# Patient Record
Sex: MALE | Race: BLACK OR AFRICAN AMERICAN | NOT HISPANIC OR LATINO | Employment: FULL TIME | URBAN - METROPOLITAN AREA
[De-identification: names, ages, dates, MRNs, and addresses within clinical notes are randomized per-mention and may not be internally consistent; named-entity substitution may affect disease eponyms.]

---

## 2017-04-26 ENCOUNTER — TELEPHONE (OUTPATIENT)
Dept: INTERNAL MEDICINE | Age: 42
End: 2017-04-26

## 2019-11-19 ENCOUNTER — HOSPITAL ENCOUNTER (OUTPATIENT)
Age: 44
Discharge: HOME | End: 2019-11-19
Payer: COMMERCIAL

## 2019-11-19 ENCOUNTER — APPOINTMENT (OUTPATIENT)
Dept: GENERAL RADIOLOGY | Age: 44
End: 2019-11-19
Attending: PHYSICIAN ASSISTANT
Payer: COMMERCIAL

## 2019-11-19 ENCOUNTER — APPOINTMENT (OUTPATIENT)
Dept: CT IMAGING | Age: 44
End: 2019-11-19
Attending: PHYSICIAN ASSISTANT
Payer: COMMERCIAL

## 2019-11-19 VITALS
RESPIRATION RATE: 16 BRPM | DIASTOLIC BLOOD PRESSURE: 75 MMHG | TEMPERATURE: 99 F | OXYGEN SATURATION: 99 % | SYSTOLIC BLOOD PRESSURE: 132 MMHG | HEART RATE: 68 BPM

## 2019-11-19 DIAGNOSIS — S49.91XA INJURY OF RIGHT SHOULDER, INITIAL ENCOUNTER: ICD-10-CM

## 2019-11-19 DIAGNOSIS — M54.6 ACUTE MIDLINE THORACIC BACK PAIN: ICD-10-CM

## 2019-11-19 DIAGNOSIS — S09.90XA INJURY OF HEAD, INITIAL ENCOUNTER: Primary | ICD-10-CM

## 2019-11-19 PROCEDURE — 96372 THER/PROPH/DIAG INJ SC/IM: CPT

## 2019-11-19 PROCEDURE — 99204 OFFICE O/P NEW MOD 45 MIN: CPT

## 2019-11-19 PROCEDURE — 73030 X-RAY EXAM OF SHOULDER: CPT | Performed by: PHYSICIAN ASSISTANT

## 2019-11-19 PROCEDURE — 70450 CT HEAD/BRAIN W/O DYE: CPT | Performed by: PHYSICIAN ASSISTANT

## 2019-11-19 PROCEDURE — 71101 X-RAY EXAM UNILAT RIBS/CHEST: CPT | Performed by: PHYSICIAN ASSISTANT

## 2019-11-19 PROCEDURE — 72072 X-RAY EXAM THORAC SPINE 3VWS: CPT | Performed by: PHYSICIAN ASSISTANT

## 2019-11-19 RX ORDER — CYCLOBENZAPRINE HCL 10 MG
10 TABLET ORAL 3 TIMES DAILY PRN
Qty: 30 TABLET | Refills: 0 | Status: SHIPPED | OUTPATIENT
Start: 2019-11-19

## 2019-11-19 RX ORDER — AZITHROMYCIN 250 MG/1
TABLET, FILM COATED ORAL
Qty: 1 PACKAGE | Refills: 0 | Status: SHIPPED | OUTPATIENT
Start: 2019-11-19 | End: 2019-11-19 | Stop reason: CLARIF

## 2019-11-19 RX ORDER — METHYLPREDNISOLONE 4 MG/1
TABLET ORAL
Qty: 1 PACKAGE | Refills: 0 | Status: SHIPPED | OUTPATIENT
Start: 2019-11-19 | End: 2019-11-19 | Stop reason: CLARIF

## 2019-11-19 RX ORDER — ALBUTEROL SULFATE 90 UG/1
2 AEROSOL, METERED RESPIRATORY (INHALATION) EVERY 4 HOURS PRN
Qty: 1 INHALER | Refills: 0 | Status: SHIPPED | OUTPATIENT
Start: 2019-11-19 | End: 2019-11-19 | Stop reason: CLARIF

## 2019-11-19 RX ORDER — KETOROLAC TROMETHAMINE 30 MG/ML
30 INJECTION, SOLUTION INTRAMUSCULAR; INTRAVENOUS ONCE
Status: COMPLETED | OUTPATIENT
Start: 2019-11-19 | End: 2019-11-19

## 2019-11-19 RX ORDER — IBUPROFEN 800 MG/1
800 TABLET ORAL EVERY 8 HOURS PRN
Qty: 20 TABLET | Refills: 0 | Status: SHIPPED | OUTPATIENT
Start: 2019-11-19

## 2019-11-20 NOTE — ED PROVIDER NOTES
Patient Seen in: [facility]      History   Patient presents with:  Fall (musculoskeletal, neurologic)  Head Neck Injury (neurologic, musculoskeletal)    Stated Complaint: s/p fall, r shoulder pain, l lower back pain, hurts to ta and vital signs reviewed. All other systems reviewed and negative except as noted above.     Physical Exam     ED Triage Vitals [11/19/19 1841]   BP (!) 158/91   Pulse 74   Resp 16   Temp 98.5 °F (36.9 °C)   Temp src Oral   SpO2 97 %   O2 Device None ( the thoracic spine. No midline tenderness of the lumbar spine. Tender to palpation with reproduction of pain over the posterior left ribs.       ED Course     Xr Thoracic Spine (3 Views) (cpt=72072)    Result Date: 11/19/2019  PROCEDURE:  XR ROUTINE THORA deep breath  TECHNIQUE:  Noncontrast CT scanning is performed through the brain. Dose reduction techniques were used.  Dose information is transmitted to the [facility] ([address] [place] [address] of Radiology) Ramez Graves [address] ([address]) which includes the Dose silhouette. MEDIASTINUM:  Normal. PLEURA:  Normal. BONES:  Postsurgical changes involve the distal right clavicle and superior left glenoid/scapula. No definite acute fracture. SOFT TISSUES:  Negative. CONCLUSION:  No acute fracture.   Postsurgical ch

## 2019-11-20 NOTE — ED INITIAL ASSESSMENT (HPI)
Pt was standing in back of truck and fell backwards onto driveway - pt hit his back, back of head, and R shoulder ; when taking a deep breath or raising arms - pain to L rib and back    No loc/vom/hematuria    Work injury - [employer]

## 2020-08-05 ENCOUNTER — OFFICE VISIT (OUTPATIENT)
Dept: OCCUPATIONAL MEDICINE | Age: 45
End: 2020-08-05
Attending: FAMILY MEDICINE

## 2023-02-09 NOTE — LETTER
Date & Time: 11/19/2019, 8:28 PM  Patient: China Davis  Encounter Provider(s):    Jorge A Mccarthy PA-C       To Whom It May Concern:    China Davis was seen and treated in our department on 11/19/2019.  Please excuse him from work the next 2 d Prescription refused.  The patient does not need anymore at this high dose.  The patient will continue with supplements over-the-counter 2000 units daily.

## 2024-02-23 PROBLEM — Z12.11 SPECIAL SCREENING FOR MALIGNANT NEOPLASM OF COLON: Status: ACTIVE | Noted: 2024-02-23

## 2024-06-28 ENCOUNTER — OFFICE VISIT (OUTPATIENT)
Dept: ORTHOPEDICS CLINIC | Facility: CLINIC | Age: 49
End: 2024-06-28

## 2024-06-28 VITALS — HEIGHT: 63 IN | WEIGHT: 175 LBS | BODY MASS INDEX: 31.01 KG/M2

## 2024-06-28 DIAGNOSIS — M23.92 KNEE LOCKING, LEFT: Primary | ICD-10-CM

## 2024-06-28 PROCEDURE — 99204 OFFICE O/P NEW MOD 45 MIN: CPT | Performed by: PHYSICIAN ASSISTANT

## 2024-06-28 NOTE — H&P
[facility] - ORTHOPEDICS  [address] [place] [address]  [phone]     NEW PATIENT VISIT - HISTORY AND PHYSICAL EXAMINATION     Name: Jose Alfredo Gunderson   MRN: [id number]  Date: 6/28/2024     CC: Left knee pain.     REFERRED BY: CHRISTIANO HILL    HPI:   Jose Alfredo Gunderson is a very pleasant 48 year old male who presents today for evaluation, consultation, and management of left knee injury that occurred after working on a lift gate off of a truck at work on June 21, 2024.  He reported the injury and has had stiffness, locking and weakness.  Pain is an 8 out of 10.  He works as a  for [employer] and has been in this position for 2 years. No previously left knee.       PMH:   Past Medical History:    Decorative tattoo    Headache disorder    Pain in joints       PAST SURGICAL HX:  Past Surgical History:   Procedure Laterality Date    Other      R patellar surgery    Other      L shoulder \"restructured\"    Sinus surgery        Vasectomy         FAMILY HX:  Family History   Problem Relation Age of Onset    Heart Attack Father     Diabetes Mother        ALLERGIES:  Patient has no known allergies.    MEDICATIONS:   Current Outpatient Medications   Medication Sig Dispense Refill    PEG 3350-KCl-Na Bicarb-NaCl 420 g Oral Recon Soln Take as directed by physician 4000 mL 0    mupirocin (BACTROBAN) 2 % External Ointment Apply to affected area 3 times a day 1 Tube 0    Cyclobenzaprine HCl 10 MG Oral Tab Take 1 tablet (10 mg total) by mouth 3 (three) times daily as needed for Muscle spasms. 30 tablet 0    ibuprofen 800 MG Oral Tab Take 1 tablet (800 mg total) by mouth every 8 (eight) hours as needed for Pain. 20 tablet 0       ROS: A comprehensive 14 point review of systems was performed and was negative aside from the aforementioned per history of present illness.    SOCIAL HX:  Social History     Occupational History    Not on file   Tobacco Use    Smoking status: Never    Smokeless  tobacco: Never   Substance and Sexual Activity    Alcohol use: Never    Drug use: Never    Sexual activity: Not on file       PE:   Vitals:    06/28/24 0746   Weight: 175 lb (79.4 kg)   Height: 5' 3\" (1.6 m)     Estimated body mass index is 31 kg/m² as calculated from the following:    Height as of this encounter: 5' 3\" (1.6 m).    Weight as of this encounter: 175 lb (79.4 kg).    Physical Exam  Constitutional:       Appearance: Normal appearance.   HENT:      Head: Normocephalic and atraumatic.   Eyes:      Extraocular Movements: Extraocular movements intact.   Neck:      Musculoskeletal: Normal range of motion and neck supple.   Cardiovascular:      Pulses: Normal pulses.   Pulmonary:      Effort: Pulmonary effort is normal. No respiratory distress.   Abdominal:      General: There is no distension.   Skin:     General: Skin is warm.      Capillary Refill: Capillary refill takes less than 2 seconds.      Findings: No bruising.   Neurological:      General: No focal deficit present.      Mental Status: Alert.   Psychiatric:         Mood and Affect: Mood normal.     Examination of the left knee demonstrates:     Skin is intact, warm and dry.   Atrophy: none    Effusion: small    Joint line tenderness: none  Crepitation: none   Chelita: Positive   Patellar mobility: normal without apprehension  J-sign: none    ROM: Extension full  Flexion 90 degrees  ACL:  Negative Lachman, Negative Pivot Shift   PCL:  Negative Posterior Drawer  Collateral Ligaments: Stable to Varus and Valgus stress at 0 and 30 degrees  Strength: normal   Hip joint: normal pain-free ROM   Gait:  normal   Leg length: equal and symmetric  Alignment:  neutral     No obvious peripheral edema noted.   Distal neurovascular exam demonstrates normal perfusion, intact sensation to light touch and full strength.       Radiographic Examination/Diagnostics:  I personally viewed, independently interpreted and radiology report was reviewed.      X-rays report  from 6/21/2024 of left knee shows no fracture, foreign body or soft tissue injury.     IMPRESSION: Jose Alfredo Gunderson is a 48 year old male who presents with left knee injury concerning for internal derangement/meniscus tear.     PLAN:   We had a detailed discussion outlining the etiology, anatomy, pathophysiology, and natural history of the patient's findings. Imaging was reviewed in detail and correlated to a 3-dimensional model of the patient's pathology.     We reviewed the treatment of this disease condition.  In light of the acute traumatic incident, loss of normal function, and  failure to progress conservatively we recommend an MRI to evaluate the integrity of the patient's left knee meniscus and rule out internal derangement. The patient will follow up after imaging.   Differential diagnosis includes but not limited to: cartilage injury/loose body, meniscus tear/injury, ACL tear, bone marrow edema, and osteoarthritis.     External records were also reviewed for pertinent historical findings contributing to the patients undiagnosed new problem with uncertain prognosis.     The patient had the opportunity to ask questions, and all questions were answered appropriately.     The patient can return to work sedentary work, desk work only. He is not permitted to drive at this time.     I find this to be medically necessary based to prevent loss working time, unnecessary treatment, and delayed intervention.       FOLLOW-UP:  Return to clinic following completion of MRI to review scan and findings.             Bao Peng, [facility], PA-C Orthopedic Surgery / Sports Medicine Specialist  [facility]  [address] [place] [address]   [email or web address]  [email or web address]  t: [phone]  o: [phone]  f: [phone]    This note was dictated using Dragon software.  While it was briefly proofread prior to completion, some grammatical, spelling, and word choice errors due to dictation may  still occur.

## 2024-07-01 ENCOUNTER — MED REC SCAN ONLY (OUTPATIENT)
Facility: CLINIC | Age: 49
End: 2024-07-01

## 2024-07-01 ENCOUNTER — TELEPHONE (OUTPATIENT)
Dept: ORTHOPEDICS CLINIC | Facility: CLINIC | Age: 49
End: 2024-07-01

## 2024-07-01 NOTE — TELEPHONE ENCOUNTER
W/C Patient called to ask Sincer for an updated work note -     Sincer had written a note stating patient can work at a desk, however, his employer does not offer desk work and patient cannot work on his feet.    He is scheduled to have the MRI on 7/12, as this is the soonest date he could get in.    He is still in a lot pain and takes pain medication which makes it unsafe for him to work.    Please advise if Sincer could write a new work note.

## 2024-07-02 ENCOUNTER — TELEPHONE (OUTPATIENT)
Dept: ORTHOPEDICS CLINIC | Facility: CLINIC | Age: 49
End: 2024-07-02

## 2024-07-02 DIAGNOSIS — M23.92 KNEE LOCKING, LEFT: Primary | ICD-10-CM

## 2024-07-02 NOTE — TELEPHONE ENCOUNTER
[facility], states release was sent with form.  Confirmed valid auth with forms.Scanned to chart.      Type of Leave: continuous  Reason for Leave: knee pain (work injury)  Start date of leave: 6/24/24 - pending 7/17/24 re-eval  How much time needed?:   Forms Due Date:  Was Fee and Turnaround info Given? yes    Patient requests forms be uploaded to [employer] as well as faxed

## 2024-07-02 NOTE — TELEPHONE ENCOUNTER
Disability and duty status reprot received in forms department and logged for processing. No Release of Information - patient sent MyChart message.

## 2024-07-03 ENCOUNTER — HOSPITAL ENCOUNTER (OUTPATIENT)
Dept: GENERAL RADIOLOGY | Age: 49
Discharge: HOME | End: 2024-07-03
Attending: INTERNAL MEDICINE
Payer: COMMERCIAL

## 2024-07-03 DIAGNOSIS — M25.562 PAIN IN JOINT OF LEFT KNEE: ICD-10-CM

## 2024-07-03 PROCEDURE — 73562 X-RAY EXAM OF KNEE 3: CPT | Performed by: INTERNAL MEDICINE

## 2024-07-12 ENCOUNTER — HOSPITAL ENCOUNTER (OUTPATIENT)
Dept: MRI IMAGING | Facility: HOSPITAL | Age: 49
Discharge: HOME | End: 2024-07-12
Attending: PHYSICIAN ASSISTANT
Payer: OTHER MISCELLANEOUS

## 2024-07-12 DIAGNOSIS — M23.92 KNEE LOCKING, LEFT: ICD-10-CM

## 2024-07-12 PROCEDURE — 73721 MRI JNT OF LWR EXTRE W/O DYE: CPT | Performed by: PHYSICIAN ASSISTANT

## 2024-07-15 NOTE — TELEPHONE ENCOUNTER
Sincer,    **2 forms needing your signature**     *The ACKNOWLEDGE button has been moved to the top right ribbon*    Please sign off on form if you agree to: disability #1 and disability#2 due to L knee injury. Start date 06/24/24-07/17/24 pending re-eval.  (place your signature on the first page only)    -From your Inbasket, Highlight the patient and click Chart   -Double click the 07/02/2024 Forms Completion telephone encounter  -Scroll down to the Media section   -Click the blue Hyperlink: disability#1 S. Danish 07/15/24 and disability #2 S. Danish 07/15/24  -Click Acknowledge located in the top right ribbon/menu   -Drag the mouse into the blank space of the document and a + sign will appear. Left click to   electronically sign the document.     Thank you,    Lizz

## 2024-07-17 ENCOUNTER — OFFICE VISIT (OUTPATIENT)
Dept: ORTHOPEDICS CLINIC | Facility: CLINIC | Age: 49
End: 2024-07-17
Payer: OTHER MISCELLANEOUS

## 2024-07-17 DIAGNOSIS — M23.92 KNEE LOCKING, LEFT: Primary | ICD-10-CM

## 2024-07-17 DIAGNOSIS — T14.8XXA TENDON TEAR: ICD-10-CM

## 2024-07-17 PROCEDURE — 99213 OFFICE O/P EST LOW 20 MIN: CPT | Performed by: PHYSICIAN ASSISTANT

## 2024-07-17 NOTE — PROGRESS NOTES
[facility] - ORTHOPEDICS  [address] [place] [address]  [phone]       Name: Jose Alfredo Gunderson   MRN: [id number]  Date: 7/17/2024     REASON FOR VISIT: Follow up for  left knee injury concerning for internal derangement/meniscus tear.     INTERVAL HISTORY:  Jose Alfredo Gunderson is a 48 year old male who returns for evaluation of  left knee injury concerning for internal derangement/meniscus tear.     To summarize,  left knee injury that occurred after working on a lift gate off of a truck at work on June 21, 2024.  He reported the injury and has had stiffness, locking and weakness.  Pain is an 8 out of 10.  He works as a  for [employer] and has been in this position for 2 years. No previously left knee.     At the patient's last visit we recommended an MRI.  They present today for evaluation.  Pain is a 4 out of 10.    ROS: ROS    PE:   There were no vitals filed for this visit.  Estimated body mass index is 31 kg/m² as calculated from the following:    Height as of 6/28/24: 5' 3\" (1.6 m).    Weight as of 6/28/24: 175 lb (79.4 kg).    Physical Exam  Constitutional:       Appearance: Normal appearance.   HENT:      Head: Normocephalic and atraumatic.   Eyes:      Extraocular Movements: Extraocular movements intact.   Neck:      Musculoskeletal: Normal range of motion and neck supple.   Cardiovascular:      Pulses: Normal pulses.   Pulmonary:      Effort: Pulmonary effort is normal. No respiratory distress.   Abdominal:      General: There is no distension.   Skin:     General: Skin is warm.      Capillary Refill: Capillary refill takes less than 2 seconds.      Findings: No bruising.   Neurological:      General: No focal deficit present.      Mental Status: She is alert.   Psychiatric:         Mood and Affect: Mood normal.       Examination of the left knee demonstrates:      Skin is intact, warm and dry.   Atrophy: none    Effusion: small    Joint line tenderness:  none  Crepitation: none   Chelita: Positive   Patellar mobility: normal without apprehension  J-sign: none    ROM: Extension full  Flexion 90 degrees  ACL:  Negative Lachman, Negative Pivot Shift   PCL:  Negative Posterior Drawer  Collateral Ligaments: Stable to Varus and Valgus stress at 0 and 30 degrees  Strength: normal   Hip joint: normal pain-free ROM   Gait:  normal   Leg length: equal and symmetric  Alignment:  neutral      No obvious peripheral edema noted.   Distal neurovascular exam demonstrates normal perfusion, intact sensation to light touch and full strength.     Radiographic Examination/Diagnostics:    I personally viewed, independently interpreted and radiology report was reviewed.    MRI KNEE, LEFT (BBS=06927)    Result Date: 7/13/2024  PROCEDURE:  MRI KNEE, LEFT (CPT=73721)  COMPARISON:  None.  INDICATIONS:  M23.92 Knee locking, left  TECHNIQUE:  Axial, coronal, and sagittal proton density with and without fat saturation images were obtained.  PATIENT STATED HISTORY: (As transcribed by Technologist)  The patient stated he has been experiencing left knee pain since he injured his knee at [employer] and felt a popping sensation.    FINDINGS:  LIGAMENTS:          There is a striated appearance of the ACL consistent with cystic degeneration without tear.  The PCL and the collateral ligaments are intact. MENISCI:            The medial and lateral menisci are intact without evidence of tear or significant degenerative change. TENDONS:            There is intrasubstance increased signal within the deep fibers of the patellar tendon at the attachment on the patella with some fluid signal and discontinuity of the deep fibers consistent with severe tendinopathy and partial-thickness tear of the patellar tendon.  There is associated reactive edema in lower pole of the patella.  This involves primarily the central portion of the patellar tendon.  The peripheral portions of the tendon are otherwise intact.  The  quadriceps tendon is intact. MUSCULATURE:        No evidence of strain, edema, or atrophy. BONY COMPARTMENTS:  No evidence of chondromalacia or cartilage defects.  Normal marrow and cortical signal. SYNOVIUM:           No evidence for effusion, synovitis, or intraarticular bodies.             CONCLUSION:  1. There is severe tendinopathy and partial-thickness tear of patellar tendon at the attachment on the lower pole of the patella with associated subchondral reactive edema in lower pole patella and overlying soft tissue swelling and edema.  This is not a  full-thickness tear and involves primarily the central portion of the tendon. 2. There is no evidence of a meniscal tear. 3. Cruciate and collateral ligaments are intact. 4. There is no high-grade cartilage lesion.    LOCATION:  [facility]          Dictated by ([facility]): Abhi Hickey MD on 7/13/2024 at 9:27 AM     Finalized by (CST): Abhi Hickey MD on 7/13/2024 at 9:31 AM       XR KNEE (3 VIEWS), LEFT (CPT=73562)    Result Date: 7/3/2024  PROCEDURE:  XR KNEE ROUTINE (3 VIEWS), LEFT (CPT=73562)  TECHNIQUE:  Three views were obtained including patellar view.  COMPARISON:  None.  INDICATIONS:  Knee pain  PATIENT STATED HISTORY: (As transcribed by Technologist)  Injury at work in 6/2024, pain in left knee.     FINDINGS:  BONES:  Normal.  No significant arthropathy or acute abnormality. SOFT TISSUES:  Negative.  No visible soft tissue swelling. EFFUSION:  None visible. OTHER:  Negative.            CONCLUSION:  No acute osseous abnormality.   LOCATION:  [facility]   Dictated by ([facility]): Rome Moore MD on 7/03/2024 at 4:05 PM     Finalized by (CST): Rome Moroe MD on 7/03/2024 at 4:05 PM         IMPRESSION: Jose Alfredo Gunderson is a 48 year old male who presented for follow up of partial thickness patellar tendon tear.     PLAN:   We had a detailed discussion outlining the etiology, anatomy, pathophysiology, and natural history of the patient's findings.    We reviewed the  treatment of this disease condition.  Fortunately, treatment is amenable to conservative treatment which we chose to optimize at today's visit.      We provided education, and discussed at great length the use of OrthoBiologics, specifically, Platelet Rich Plasma (PRP). We discussed the growing evidence for the efficacy of PRP injections with regard to the patient's specific findings, as well as the promotion of healing for muscle, tendon, and joint injuries.     We discussed the scientific rationale for this procedure which is that the plasma contains platelets which release growth factors that induce a healing response wherever they are applied. We also discussed the benefits, risks, and limitations. The patient understands that there is a slightly higher level of complexity to this procedure compared to other injections such as cortisone, or viscosupplementation.     We also discussed the benefits of PRP in comparison to surgery, specifically including, but not limited to: less invasive than an open surgical procedure for the same condition, possible shorter recovery time, significantly more cost effective.     We recommended physical therapy to aid in strengthening, range of motion, functional improvement, and return to baseline activity.  The patient had opportunity to ask questions and all questions were answered appropriately.    I spent 20 minutes in preparation to see the patient, counseling/education of relevant pathology, discussing imaging results, ordering therapy  intervention, care coordination, and documentation into the electronic medical record.    FOLLOW-UP:  Return to clinic in four weeks. No imaging required at next visit.     Left knee PRP injection (patellar tendon).             Bao Peng [facility], PA-C Orthopedic Surgery / Sports Medicine Specialist  [facility]  [address] [place] [address]   [email or web address]  [email or web address]  t: [phone]  o: [phone]   f: [phone]    This note was dictated using Dragon software.  While it was briefly proofread prior to completion, some grammatical, spelling, and word choice errors due to dictation may still occur.

## 2024-08-06 ENCOUNTER — MED REC SCAN ONLY (OUTPATIENT)
Dept: ORTHOPEDICS CLINIC | Facility: CLINIC | Age: 49
End: 2024-08-06

## 2024-08-13 DIAGNOSIS — T14.8XXA TENDON TEAR: Primary | ICD-10-CM

## 2024-08-14 ENCOUNTER — OFFICE VISIT (OUTPATIENT)
Dept: ORTHOPEDICS CLINIC | Facility: CLINIC | Age: 49
End: 2024-08-14

## 2024-08-14 DIAGNOSIS — T14.8XXA TENDON TEAR: Primary | ICD-10-CM

## 2024-08-14 PROCEDURE — 99213 OFFICE O/P EST LOW 20 MIN: CPT | Performed by: PHYSICIAN ASSISTANT

## 2024-08-14 NOTE — PROGRESS NOTES
[facility] - ORTHOPEDICS  [address] [place] [address]  [phone]       Name: Jose Alfredo Gunderson   MRN: [id number]  Date: 8/14/2024     REASON FOR VISIT: Follow up for left knee injury concerning for internal derangement/meniscus tear.     INTERVAL HISTORY:  Jose Alfredo Gunderson is a 48 year old male who returns for evaluation of left knee injury concerning for internal derangement/meniscus tear.     To summarize,  left knee injury that occurred after working on a lift gate off of a truck at work on June 21, 2024.  He reported the injury and has had stiffness, locking and weakness.  Pain is an 8 out of 10.  He works as a  for [employer] and has been in this position for 2 years. No previously left knee.           ROS: ROS    PE:   There were no vitals filed for this visit.  Estimated body mass index is 31 kg/m² as calculated from the following:    Height as of 6/28/24: 5' 3\" (1.6 m).    Weight as of 6/28/24: 175 lb (79.4 kg).    Physical Exam  Constitutional:       Appearance: Normal appearance.   HENT:      Head: Normocephalic and atraumatic.   Eyes:      Extraocular Movements: Extraocular movements intact.   Neck:      Musculoskeletal: Normal range of motion and neck supple.   Cardiovascular:      Pulses: Normal pulses.   Pulmonary:      Effort: Pulmonary effort is normal. No respiratory distress.   Abdominal:      General: There is no distension.   Skin:     General: Skin is warm.      Capillary Refill: Capillary refill takes less than 2 seconds.      Findings: No bruising.   Neurological:      General: No focal deficit present.      Mental Status: She is alert.   Psychiatric:         Mood and Affect: Mood normal.     Examination of the left knee demonstrates:     Skin is intact, warm and dry.   Atrophy: none    Effusion: none    Joint line tenderness: patellar tendon   Crepitation: none   Chelita: Negative   Patellar mobility: normal without apprehension  J-sign: none     ROM: Extension full  Flexion 140 degrees  ACL:  Negative Lachman, Negative Pivot Shift   PCL:  Negative Posterior Drawer  Collateral Ligaments: Stable to Varus and Valgus stress at 0 and 30 degrees  Strength: normal   Hip joint: normal pain-free ROM   Gait:  normal   Leg length: equal and symmetric  Alignment:  neutral     No obvious peripheral edema noted.   Distal neurovascular exam demonstrates normal perfusion, intact sensation to light touch and full strength.         Radiographic Examination/Diagnostics:    I personally viewed, independently interpreted and radiology report was reviewed.    MRI KNEE, LEFT (STX=31098)     Result Date: 7/13/2024  PROCEDURE:  MRI KNEE, LEFT (CPT=73721)  COMPARISON:  None.  INDICATIONS:  M23.92 Knee locking, left  TECHNIQUE:  Axial, coronal, and sagittal proton density with and without fat saturation images were obtained.  PATIENT STATED HISTORY: (As transcribed by Technologist)  The patient stated he has been experiencing left knee pain since he injured his knee at [employer] and felt a popping sensation.    FINDINGS:  LIGAMENTS:          There is a striated appearance of the ACL consistent with cystic degeneration without tear.  The PCL and the collateral ligaments are intact. MENISCI:            The medial and lateral menisci are intact without evidence of tear or significant degenerative change. TENDONS:            There is intrasubstance increased signal within the deep fibers of the patellar tendon at the attachment on the patella with some fluid signal and discontinuity of the deep fibers consistent with severe tendinopathy and partial-thickness tear of the patellar tendon.  There is associated reactive edema in lower pole of the patella.  This involves primarily the central portion of the patellar tendon.  The peripheral portions of the tendon are otherwise intact.  The quadriceps tendon is intact. MUSCULATURE:        No evidence of strain, edema, or atrophy. BONY  COMPARTMENTS:  No evidence of chondromalacia or cartilage defects.  Normal marrow and cortical signal. SYNOVIUM:           No evidence for effusion, synovitis, or intraarticular bodies.              CONCLUSION:  1. There is severe tendinopathy and partial-thickness tear of patellar tendon at the attachment on the lower pole of the patella with associated subchondral reactive edema in lower pole patella and overlying soft tissue swelling and edema.  This is not a  full-thickness tear and involves primarily the central portion of the tendon. 2. There is no evidence of a meniscal tear. 3. Cruciate and collateral ligaments are intact. 4. There is no high-grade cartilage lesion.    LOCATION:  [facility]          Dictated by (CST): Abhi Hickey MD on 7/13/2024 at 9:27 AM     Finalized by (CST): Abhi Hickey MD on 7/13/2024 at 9:31 AM       IMPRESSION: Jose Alfredo Gunderson is a 48 year old male who presented for follow up of left knee  partial thickness patellar tendon tear.     PLAN:   We had a detailed discussion outlining the etiology, anatomy, pathophysiology, and natural history of the patient's findings.    We reviewed the treatment of this disease condition.      We provided education, and discussed at great length the use of OrthoBiologics, specifically, Platelet Rich Plasma (PRP). We discussed the growing evidence for the efficacy of PRP injections with regard to the patient's specific findings, as well as the promotion of healing for muscle, tendon, and joint injuries.     We discussed the scientific rationale for this procedure which is that the plasma contains platelets which release growth factors that induce a healing response wherever they are applied. We also discussed the benefits, risks, and limitations. The patient understands that there is a slightly higher level of complexity to this procedure compared to other injections such as cortisone, or viscosupplementation.     We also discussed the benefits of PRP in  comparison to surgery, specifically including, but not limited to: less invasive than an open surgical procedure for the same condition, possible shorter recovery time, significantly more cost effective.     We recommended physical therapy to aid in strengthening, range of motion, functional improvement, and return to baseline activity.  The patient had opportunity to ask questions and all questions were answered appropriately.    FOLLOW-UP:  Left knee PRP once authorized, patellar tendon.             Sincer SAVAGE Peng [facility], PA-C Orthopedic Surgery / Sports Medicine Specialist  [facility] Orthopaedic Surgery  [address] [place] [address].org  [email or web address]  t: [phone]  o: [phone]  f: [phone]    This note was dictated using Dragon software.  While it was briefly proofread prior to completion, some grammatical, spelling, and word choice errors due to dictation may still occur.

## 2024-09-12 ENCOUNTER — MED REC SCAN ONLY (OUTPATIENT)
Dept: ORTHOPEDICS CLINIC | Facility: CLINIC | Age: 49
End: 2024-09-12

## 2024-09-16 ENCOUNTER — TELEPHONE (OUTPATIENT)
Dept: ORTHOPEDICS CLINIC | Facility: CLINIC | Age: 49
End: 2024-09-16

## 2024-09-24 ENCOUNTER — TELEPHONE (OUTPATIENT)
Dept: ORTHOPEDICS CLINIC | Facility: CLINIC | Age: 49
End: 2024-09-24

## 2024-10-18 ENCOUNTER — OFFICE VISIT (OUTPATIENT)
Dept: ORTHOPEDICS CLINIC | Facility: CLINIC | Age: 49
End: 2024-10-18
Payer: OTHER MISCELLANEOUS

## 2024-10-18 DIAGNOSIS — T14.8XXA TENDON TEAR: Primary | ICD-10-CM

## 2024-10-18 DIAGNOSIS — M23.92 KNEE LOCKING, LEFT: ICD-10-CM

## 2024-10-18 NOTE — PROGRESS NOTES
[facility] - ORTHOPEDICS  [address] [place] [address]  [phone]       Name: Jose Alfredo Gunderson   MRN: [id number]  Date: 10/18/2024     REASON FOR VISIT: Follow up for  left knee  partial thickness patellar tendon tear.     INTERVAL HISTORY:  Jose Alfredo Gunderson is a 49 year old male who returns for evaluation of  left knee  partial thickness patellar tendon tear.     To summarize, left knee injury that occurred after working on a lift gate off of a truck at work on June 21, 2024. He reported the injury and has had stiffness, locking and weakness.  He works as a  for [employer] and has been in this position for 2 years. No previously left knee.  At his last visit we recommended platelet rich plasma and physical therapy.       ROS: ROS    PE:   There were no vitals filed for this visit.  Estimated body mass index is 31 kg/m² as calculated from the following:    Height as of 6/28/24: 5' 3\" (1.6 m).    Weight as of 6/28/24: 175 lb (79.4 kg).    Physical Exam  Constitutional:       Appearance: Normal appearance.   HENT:      Head: Normocephalic and atraumatic.   Eyes:      Extraocular Movements: Extraocular movements intact.   Neck:      Musculoskeletal: Normal range of motion and neck supple.   Cardiovascular:      Pulses: Normal pulses.   Pulmonary:      Effort: Pulmonary effort is normal. No respiratory distress.   Abdominal:      General: There is no distension.   Skin:     General: Skin is warm.      Capillary Refill: Capillary refill takes less than 2 seconds.      Findings: No bruising.   Neurological:      General: No focal deficit present.      Mental Status: She is alert.   Psychiatric:         Mood and Affect: Mood normal.     Examination of the left knee demonstrates:     Skin is intact, warm and dry.   Atrophy: none    Effusion: none    Joint line tenderness: none  Patellar +   Crepitation: none   Chelita: Negative   Patellar mobility: normal without  apprehension  J-sign: none    ROM: Extension full  Flexion 140 degrees  ACL:  Negative Lachman, Negative Pivot Shift   PCL:  Negative Posterior Drawer  Collateral Ligaments: Stable to Varus and Valgus stress at 0 and 30 degrees  Strength: normal   Hip joint: normal pain-free ROM   Gait:  normal   Leg length: equal and symmetric  Alignment:  neutral     No obvious peripheral edema noted.   Distal neurovascular exam demonstrates normal perfusion, intact sensation to light touch and full strength.     Examination of the contralateral knee demonstrates:  No significant atrophy, swelling or effusion. Full range of motion. Neurovascularly intact distally.      Radiographic Examination/Diagnostics:    I personally viewed, independently interpreted and radiology report was reviewed.    MRI KNEE, LEFT (WMU=27660)     Result Date: 7/13/2024  PROCEDURE:  MRI KNEE, LEFT (CPT=73721)  COMPARISON:  None.  INDICATIONS:  M23.92 Knee locking, left  TECHNIQUE:  Axial, coronal, and sagittal proton density with and without fat saturation images were obtained.  PATIENT STATED HISTORY: (As transcribed by Technologist)  The patient stated he has been experiencing left knee pain since he injured his knee at [employer] and felt a popping sensation.    FINDINGS:  LIGAMENTS:          There is a striated appearance of the ACL consistent with cystic degeneration without tear.  The PCL and the collateral ligaments are intact. MENISCI:            The medial and lateral menisci are intact without evidence of tear or significant degenerative change. TENDONS:            There is intrasubstance increased signal within the deep fibers of the patellar tendon at the attachment on the patella with some fluid signal and discontinuity of the deep fibers consistent with severe tendinopathy and partial-thickness tear of the patellar tendon.  There is associated reactive edema in lower pole of the patella.  This involves primarily the central portion of the patellar  tendon.  The peripheral portions of the tendon are otherwise intact.  The quadriceps tendon is intact. MUSCULATURE:        No evidence of strain, edema, or atrophy. BONY COMPARTMENTS:  No evidence of chondromalacia or cartilage defects.  Normal marrow and cortical signal. SYNOVIUM:           No evidence for effusion, synovitis, or intraarticular bodies.              CONCLUSION:  1. There is severe tendinopathy and partial-thickness tear of patellar tendon at the attachment on the lower pole of the patella with associated subchondral reactive edema in lower pole patella and overlying soft tissue swelling and edema.  This is not a  full-thickness tear and involves primarily the central portion of the tendon. 2. There is no evidence of a meniscal tear. 3. Cruciate and collateral ligaments are intact. 4. There is no high-grade cartilage lesion.    LOCATION:  [facility]          Dictated by (CST): Abhi Hickey MD on 7/13/2024 at 9:27 AM     Finalized by (CST): Abhi Hickey MD on 7/13/2024 at 9:31 AM       IMPRESSION: Jose Alfredo Gunderson is a 49 year old male who presented for follow up of left knee  partial thickness patellar tendon tear.     PLAN:   We had a detailed discussion outlining the etiology, anatomy, pathophysiology, and natural history of the patient's findings.    We reviewed the treatment of this disease condition.  Recommend PRP and physical therapy. We recommended physical therapy to aid in strengthening, range of motion, functional improvement, and return to baseline activity.  The patient had opportunity to ask questions and all questions were answered appropriately.    FOLLOW-UP:  Left knee patellar tendon PRP once authorized.             Bao Peng [facility], PA-C Orthopedic Surgery / Sports Medicine Specialist  [facility] Orthopaedic Surgery  [address] [place] [address]   [email or web address]  [email or web address]  t: [phone]  o: [phone]  f: [phone]    This note was dictated using Dragon  software.  While it was briefly proofread prior to completion, some grammatical, spelling, and word choice errors due to dictation may still occur.

## 2024-10-23 ENCOUNTER — TELEPHONE (OUTPATIENT)
Dept: ORTHOPEDICS CLINIC | Facility: CLINIC | Age: 49
End: 2024-10-23

## 2024-10-23 DIAGNOSIS — T14.8XXA TENDON TEAR: Primary | ICD-10-CM

## 2024-10-23 DIAGNOSIS — M76.51 PATELLAR TENDINITIS OF RIGHT KNEE: ICD-10-CM

## 2024-10-23 NOTE — TELEPHONE ENCOUNTER
Shira from [facility] in [place] reached out that this patients insurance [employer] ([employer]) will only accept the referral if put in by MD. Requesting an order from Dr. Smith

## 2024-12-18 ENCOUNTER — TELEPHONE (OUTPATIENT)
Dept: ORTHOPEDICS CLINIC | Facility: CLINIC | Age: 49
End: 2024-12-18

## 2024-12-23 NOTE — TELEPHONE ENCOUNTER
Patient called back asking to schedule sal injection.   Future Appointments   Date Time Provider Department Center   1/8/2025 12:40 PM Bao Peng PA [id number]     Please advise if this is ok for this type of injection. Patient states he was told the only thing is to arrive 30 min early. Please advise if this is correct or if this needs to be moved and has further instructions.

## 2024-12-23 NOTE — TELEPHONE ENCOUNTER
Please re-schedule patient for a Tuesday with SinceJOB Morales.  Thanks!    PRP kits are in stock per Dewayne, PRP smart phrase sent to patient through [employer].Sincer JOB Peng only does PRP on Tuesdays.

## 2025-01-14 ENCOUNTER — OFFICE VISIT (OUTPATIENT)
Dept: ORTHOPEDICS CLINIC | Facility: CLINIC | Age: 50
End: 2025-01-14
Payer: OTHER MISCELLANEOUS

## 2025-01-14 ENCOUNTER — TELEPHONE (OUTPATIENT)
Dept: ORTHOPEDICS CLINIC | Facility: CLINIC | Age: 50
End: 2025-01-14

## 2025-01-14 DIAGNOSIS — T14.8XXA TENDON TEAR: Primary | ICD-10-CM

## 2025-01-14 PROCEDURE — 0232T NJX PLATELET PLASMA: CPT | Performed by: PHYSICIAN ASSISTANT

## 2025-01-14 NOTE — PROCEDURES
Left Knee Intra-articular Injection    Name: Jose Alfredo Gunderson   MRN: [id number]  Date: 1/14/2025     Clinical Indications:   Patellar Tendon     After informed consent, the injection site was marked, sterilized with topical chlorhexidine antiseptic, and locally anesthetized with skin refrigerant.    The patient was situation in a comfortable position. Using sterile technique: left patellar tendon PRP was injected utilizing anterolateral approach with a 22 gauge needle.  A band-aid was applied.  The patient tolerated the procedure well.    Disposition:   Return to clinic on an as needed basis.             Bao Peng [facility], PA-C Orthopedic Surgery / Sports Medicine Specialist  [facility]  [address] [place] [address].org  [email or web address]  t: [phone]  o: [phone]  f: [phone]    This note was dictated using Dragon software.  While it was briefly proofread prior to completion, some grammatical, spelling, and word choice errors due to dictation may still occur.

## 2025-02-15 ENCOUNTER — APPOINTMENT (OUTPATIENT)
Dept: CV DIAGNOSTICS | Facility: HOSPITAL | Age: 50
End: 2025-02-15
Attending: EMERGENCY MEDICINE
Payer: COMMERCIAL

## 2025-02-15 ENCOUNTER — HOSPITAL ENCOUNTER (EMERGENCY)
Facility: HOSPITAL | Age: 50
Discharge: HOME | End: 2025-02-15
Attending: EMERGENCY MEDICINE
Payer: COMMERCIAL

## 2025-02-15 ENCOUNTER — APPOINTMENT (OUTPATIENT)
Dept: GENERAL RADIOLOGY | Facility: HOSPITAL | Age: 50
End: 2025-02-15
Attending: EMERGENCY MEDICINE
Payer: COMMERCIAL

## 2025-02-15 VITALS
SYSTOLIC BLOOD PRESSURE: 156 MMHG | HEART RATE: 66 BPM | OXYGEN SATURATION: 100 % | TEMPERATURE: 99 F | DIASTOLIC BLOOD PRESSURE: 90 MMHG | RESPIRATION RATE: 17 BRPM

## 2025-02-15 DIAGNOSIS — R07.9 CHEST PAIN OF UNCERTAIN ETIOLOGY: Primary | ICD-10-CM

## 2025-02-15 LAB
ALBUMIN SERPL-MCNC: 4.8 G/DL (ref 3.2–4.8)
ALBUMIN/GLOB SERPL: 1.5 {RATIO} (ref 1–2)
ALP LIVER SERPL-CCNC: 59 U/L
ALT SERPL-CCNC: 33 U/L
ANION GAP SERPL CALC-SCNC: 12 MMOL/L (ref 0–18)
AST SERPL-CCNC: 29 U/L (ref ?–34)
ATRIAL RATE: 71 BPM
BASOPHILS # BLD AUTO: 0.04 X10(3) UL (ref 0–0.2)
BASOPHILS NFR BLD AUTO: 0.4 %
BILIRUB SERPL-MCNC: 0.5 MG/DL (ref 0.3–1.2)
BUN BLD-MCNC: 12 MG/DL (ref 9–23)
CALCIUM BLD-MCNC: 9.8 MG/DL (ref 8.7–10.6)
CHLORIDE SERPL-SCNC: 103 MMOL/L (ref 98–112)
CO2 SERPL-SCNC: 25 MMOL/L (ref 21–32)
CREAT BLD-MCNC: 1.54 MG/DL
D DIMER PPP FEU-MCNC: <0.27 UG/ML FEU (ref ?–0.51)
EGFRCR SERPLBLD CKD-EPI 2021: 54 ML/MIN/1.73M2 (ref 60–?)
EOSINOPHIL # BLD AUTO: 0.13 X10(3) UL (ref 0–0.7)
EOSINOPHIL NFR BLD AUTO: 1.4 %
ERYTHROCYTE [DISTWIDTH] IN BLOOD BY AUTOMATED COUNT: 11.9 %
GLOBULIN PLAS-MCNC: 3.1 G/DL (ref 2–3.5)
GLUCOSE BLD-MCNC: 89 MG/DL (ref 70–99)
HCT VFR BLD AUTO: 46.1 %
HGB BLD-MCNC: 15.7 G/DL
IMM GRANULOCYTES # BLD AUTO: 0.02 X10(3) UL (ref 0–1)
IMM GRANULOCYTES NFR BLD: 0.2 %
LYMPHOCYTES # BLD AUTO: 3.05 X10(3) UL (ref 1–4)
LYMPHOCYTES NFR BLD AUTO: 33.2 %
MCH RBC QN AUTO: 31 PG (ref 26–34)
MCHC RBC AUTO-ENTMCNC: 34.1 G/DL (ref 31–37)
MCV RBC AUTO: 91.1 FL
MONOCYTES # BLD AUTO: 0.73 X10(3) UL (ref 0.1–1)
MONOCYTES NFR BLD AUTO: 7.9 %
NEUTROPHILS # BLD AUTO: 5.23 X10 (3) UL (ref 1.5–7.7)
NEUTROPHILS # BLD AUTO: 5.23 X10(3) UL (ref 1.5–7.7)
NEUTROPHILS NFR BLD AUTO: 56.9 %
OSMOLALITY SERPL CALC.SUM OF ELEC: 289 MOSM/KG (ref 275–295)
P AXIS: 40 DEGREES
P-R INTERVAL: 110 MS
PLATELET # BLD AUTO: 309 10(3)UL (ref 150–450)
POTASSIUM SERPL-SCNC: 3.8 MMOL/L (ref 3.5–5.1)
PROT SERPL-MCNC: 7.9 G/DL (ref 5.7–8.2)
Q-T INTERVAL: 388 MS
QRS DURATION: 104 MS
QTC CALCULATION (BEZET): 421 MS
R AXIS: 1 DEGREES
RBC # BLD AUTO: 5.06 X10(6)UL
SODIUM SERPL-SCNC: 140 MMOL/L (ref 136–145)
T AXIS: 0 DEGREES
TROPONIN I SERPL HS-MCNC: 7 NG/L
VENTRICULAR RATE: 71 BPM
WBC # BLD AUTO: 9.2 X10(3) UL (ref 4–11)

## 2025-02-15 PROCEDURE — 93017 CV STRESS TEST TRACING ONLY: CPT | Performed by: EMERGENCY MEDICINE

## 2025-02-15 PROCEDURE — 93005 ELECTROCARDIOGRAM TRACING: CPT

## 2025-02-15 PROCEDURE — 85379 FIBRIN DEGRADATION QUANT: CPT | Performed by: EMERGENCY MEDICINE

## 2025-02-15 PROCEDURE — 85025 COMPLETE CBC W/AUTO DIFF WBC: CPT | Performed by: EMERGENCY MEDICINE

## 2025-02-15 PROCEDURE — 93350 STRESS TTE ONLY: CPT | Performed by: EMERGENCY MEDICINE

## 2025-02-15 PROCEDURE — 84484 ASSAY OF TROPONIN QUANT: CPT | Performed by: EMERGENCY MEDICINE

## 2025-02-15 PROCEDURE — 99285 EMERGENCY DEPT VISIT HI MDM: CPT

## 2025-02-15 PROCEDURE — 93010 ELECTROCARDIOGRAM REPORT: CPT

## 2025-02-15 PROCEDURE — 93018 CV STRESS TEST I&R ONLY: CPT | Performed by: EMERGENCY MEDICINE

## 2025-02-15 PROCEDURE — 36415 COLL VENOUS BLD VENIPUNCTURE: CPT

## 2025-02-15 PROCEDURE — 71045 X-RAY EXAM CHEST 1 VIEW: CPT | Performed by: EMERGENCY MEDICINE

## 2025-02-15 PROCEDURE — 80053 COMPREHEN METABOLIC PANEL: CPT | Performed by: EMERGENCY MEDICINE

## 2025-02-15 RX ORDER — CYCLOBENZAPRINE HCL 10 MG
10 TABLET ORAL ONCE
Status: COMPLETED | OUTPATIENT
Start: 2025-02-15 | End: 2025-02-15

## 2025-02-15 RX ORDER — NITROGLYCERIN 0.4 MG/1
0.4 TABLET SUBLINGUAL ONCE
Status: COMPLETED | OUTPATIENT
Start: 2025-02-15 | End: 2025-02-15

## 2025-02-15 RX ORDER — CYCLOBENZAPRINE HCL 10 MG
10 TABLET ORAL 3 TIMES DAILY PRN
Qty: 20 TABLET | Refills: 0 | Status: SHIPPED | OUTPATIENT
Start: 2025-02-15 | End: 2025-02-22

## 2025-02-15 RX ORDER — KETOROLAC TROMETHAMINE 15 MG/ML
15 INJECTION, SOLUTION INTRAMUSCULAR; INTRAVENOUS ONCE
Status: DISCONTINUED | OUTPATIENT
Start: 2025-02-15 | End: 2025-02-15

## 2025-02-15 RX ORDER — ASPIRIN 81 MG/1
324 TABLET, CHEWABLE ORAL ONCE
Status: COMPLETED | OUTPATIENT
Start: 2025-02-15 | End: 2025-02-15

## 2025-02-15 NOTE — ED QUICK NOTES
Okay for discharge per Dr. Cullen, script was sent to patient's pharmacy, work notes were provided to patient and patient's wife at bedside; patient is AOX4, no questions/complaints, independently ambulatory, verbalized understanding of discharge instructions including need to follow up with cardiology referral provided

## 2025-02-15 NOTE — ED INITIAL ASSESSMENT (HPI)
Intermittent pressure to middle of chest with numbness down both arms since yesterday, describes as feeling \"like heartburn.\" Patient is AOX4; denies dyspnea, denies N/V

## 2025-02-15 NOTE — ED PROVIDER NOTES
Patient Seen in: [facility] Emergency Department      History     Chief Complaint   Patient presents with    Chest Pain Angina     1.5 days     Stated Complaint: Chest pain x 1.5 days now worse    Subjective:   HPI      49-year-old male presents for evaluation of chest pressure.  Patient has had intermittent pressure in his chest for the past 2 days.  Pain is intermittent and seems worse when bending over and moving.  He has associated numbness and tingling in both arms.  Patient has a history of longstanding hypertension but just started medication 1 month ago.    Objective:     Past Medical History:    Decorative tattoo    Headache disorder    Pain in joints              Past Surgical History:   Procedure Laterality Date    Other      R patellar surgery    Other      L shoulder \"restructured\"    Sinus surgery        Vasectomy                  Social History     Socioeconomic History    Marital status:    Tobacco Use    Smoking status: Never    Smokeless tobacco: Never   Vaping Use    Vaping status: Never Used   Substance and Sexual Activity    Alcohol use: Never    Drug use: Never     Social Drivers of Health      Received from [employer]                  Physical Exam     ED Triage Vitals   BP 02/15/25 0845 158/87   Pulse 02/15/25 0845 63   Resp 02/15/25 0845 15   Temp 02/15/25 0850 98.5 °F (36.9 °C)   Temp src 02/15/25 0850 Oral   SpO2 02/15/25 0845 100 %   O2 Device 02/15/25 0900 None (Room air)       Current Vitals:   Vital Signs  BP: (!) 144/91  Pulse: 56  Resp: 20  Temp: 98.5 °F (36.9 °C)  Temp src: Oral  MAP (mmHg): (!) 105    Oxygen Therapy  SpO2: 98 %  O2 Device: None (Room air)        Physical Exam  General: Alert, oriented, no apparent distress  HEENT:  Pupils equal reactive.  Extraocular motions intact. MMM.  Neck: Supple  Lungs: Clear to auscultation bilaterally.  Heart: Regular rate and rhythm.  Abdomen: Soft, nontender.   Skin: No rash.  No  edema.  Neurologic: No focal neurologic deficits.  Normal speech pattern  Musculoskeletal: No tenderness or deformity noted.  Full range of motion throughout.        ED Course     Labs Reviewed   COMP METABOLIC PANEL (14) - Abnormal; Notable for the following components:       Result Value    Creatinine 1.54 (*)     eGFR-Cr 54 (*)     All other components within normal limits   TROPONIN I HIGH SENSITIVITY - Normal   D-DIMER - Normal   CBC WITH DIFFERENTIAL WITH PLATELET   RAINBOW DRAW LAVENDER   RAINBOW DRAW LIGHT GREEN   RAINBOW DRAW BLUE   RAINBOW DRAW GOLD     EKG    Rate, intervals and axes as noted on EKG Report.  Rate: 69  Rhythm: Sinus Rhythm  Reading: LVH with some nonspecific ST changes.  No ST elevation                       MDM      Differential diagnosis includes ACS, pneumonia, pneumothorax, musculoskeletal chest pain    Medications   ketorolac (Toradol) 15 MG/ML injection 15 mg (15 mg Intravenous Not Given 2/15/25 1001)   aspirin chewable tab 324 mg (324 mg Oral Given 2/15/25 0904)   nitroglycerin (Nitrostat) SL tab 0.4 mg (0.4 mg Sublingual Given 2/15/25 0905)   cyclobenzaprine (Flexeril) tab 10 mg (10 mg Oral Given 2/15/25 0955)     Chemistry, CBC and troponin unremarkable    D-dimer negative    Nitroglycerin did not seem to change the patient's pain.  He was given Toradol and Flexeril with suspicion for musculoskeletal etiology.    Spoke with Lisandra CRAVEN.  We will proceed with stress test given the patient's history of uncontrolled high blood pressure.    Stress echo NORMAL    Medical Decision Making  Amount and/or Complexity of Data Reviewed  Independent Historian: spouse    [facility] will call patient for a follow-up on Monday.  I have given him a prescription for Flexeril as some of this seems to be musculoskeletal    Disposition and Plan     Clinical Impression:  1. Chest pain of uncertain etiology         Disposition:  Discharge  2/15/2025  1:10 pm    Follow-up:  Lisandra Tucker  [address] [place]  [address] [address]  [phone]    Schedule an appointment as soon as possible for a visit in 2 day(s)            Medications Prescribed:  Current Discharge Medication List        START taking these medications    Details   cyclobenzaprine 10 MG Oral Tab Take 1 tablet (10 mg total) by mouth 3 (three) times daily as needed for Muscle spasms.  Qty: 20 tablet, Refills: 0                 Supplementary Documentation:

## 2025-02-15 NOTE — PROGRESS NOTES
Cardiodiagnostics: Pt walked for 5:22 on a Johnny protocol achieving 75% of APMHR. Pt c/o increasing chest pain associated with increase respirations. Isolated PAC noted. Echo images pending

## 2025-03-24 ENCOUNTER — OFFICE VISIT (OUTPATIENT)
Dept: ORTHOPEDICS CLINIC | Facility: CLINIC | Age: 50
End: 2025-03-24
Payer: OTHER MISCELLANEOUS

## 2025-03-24 DIAGNOSIS — T14.8XXA TENDON TEAR: Primary | ICD-10-CM

## 2025-03-24 DIAGNOSIS — M76.52 PATELLAR TENDONITIS OF LEFT KNEE: ICD-10-CM

## 2025-03-24 NOTE — PROGRESS NOTES
[facility] - ORTHOPEDICS  [address] [place] [id number]  [phone]       Name: Jose Alfredo Gunderson   MRN: [id number]  Date: 3/24/2025     REASON FOR VISIT: Follow up for left knee partial thickness patellar tendon tear.     INTERVAL HISTORY:  Jose Alfredo Gunderson is a 49 year old male who returns for evaluation of left knee partial thickness patellar tendon tear.     To summarize, left knee injury that occurred after working on a lift gate off of a truck at work on June 21, 2024. He reported the injury and has had stiffness, locking and weakness.  He works as a  for [employer] and has been in this position for 2 years. No previously left knee.  At his last visit we recommended platelet rich plasma and physical therapy.     He underwent left knee patellar tendon injection on January 14, 2025.  He complains of some pain, for 10.      ROS: ROS    PE:   There were no vitals filed for this visit.  Estimated body mass index is 31 kg/m² as calculated from the following:    Height as of 6/28/24: 5' 3\" (1.6 m).    Weight as of 6/28/24: 175 lb (79.4 kg).    Physical Exam  Constitutional:       Appearance: Normal appearance.   HENT:      Head: Normocephalic and atraumatic.   Eyes:      Extraocular Movements: Extraocular movements intact.   Neck:      Musculoskeletal: Normal range of motion and neck supple.   Cardiovascular:      Pulses: Normal pulses.   Pulmonary:      Effort: Pulmonary effort is normal. No respiratory distress.   Abdominal:      General: There is no distension.   Skin:     General: Skin is warm.      Capillary Refill: Capillary refill takes less than 2 seconds.      Findings: No bruising.   Neurological:      General: No focal deficit present.      Mental Status: She is alert.   Psychiatric:         Mood and Affect: Mood normal.     Examination of the left knee demonstrates:     Skin is intact, warm and dry.   Atrophy: none    Effusion: none    Joint line tenderness:  none  Crepitation: none   Chelita: Negative   Patellar mobility: normal without apprehension  J-sign: none    ROM: Extension full  Flexion 140 degrees  ACL:  Negative Lachman, Negative Pivot Shift   PCL:  Negative Posterior Drawer  Collateral Ligaments: Stable to Varus and Valgus stress at 0 and 30 degrees  Strength: normal   Hip joint: normal pain-free ROM   Gait:  normal   Leg length: equal and symmetric  Alignment:  neutral     No obvious peripheral edema noted.   Distal neurovascular exam demonstrates normal perfusion, intact sensation to light touch and full strength.     Examination of the contralateral knee demonstrates:  No significant atrophy, swelling or effusion. Full range of motion. Neurovascularly intact distally.      Radiographic Examination/Diagnostics:    I personally viewed, independently interpreted and radiology report was reviewed.    No results found.    IMPRESSION: Jose Alfredo Gunderson is a 49 year old male who presented for follow up of left partial tendon tear.     PLAN:   We had a detailed discussion outlining the etiology, anatomy, pathophysiology, and natural history of the patient's findings.    We reviewed the treatment of this disease condition.  Therapist is aware of cardiac issues, plan discussed.     We recommended physical therapy to aid in strengthening, range of motion, functional improvement, and return to baseline activity.  The patient had opportunity to ask questions and all questions were answered appropriately.    FOLLOW-UP:  Return to clinic on an as needed basis.             Bao Peng [facility], PA-C Orthopedic Surgery / Sports Medicine Specialist  [facility]  [address] [place] [address].org  [email or web address]  t: [phone]  o: [phone]  f: [phone]    This note was dictated using Dragon software.  While it was briefly proofread prior to completion, some grammatical, spelling, and word choice errors due to dictation  may still occur.

## 2025-03-25 RX ORDER — AMOXICILLIN 500 MG
1 CAPSULE ORAL DAILY
COMMUNITY

## 2025-03-25 RX ORDER — AMLODIPINE BESYLATE 10 MG/1
10 TABLET ORAL DAILY
COMMUNITY

## 2025-03-25 RX ORDER — ATORVASTATIN CALCIUM 80 MG/1
80 TABLET, FILM COATED ORAL NIGHTLY
COMMUNITY

## 2025-03-25 RX ORDER — ASPIRIN 81 MG/1
81 TABLET ORAL DAILY
COMMUNITY

## 2025-03-25 RX ORDER — CLOPIDOGREL BISULFATE 75 MG/1
75 TABLET ORAL DAILY
COMMUNITY

## 2025-03-25 RX ORDER — MULTIVIT-MINERALS/FOLIC ACID 200 MCG
1 TABLET,CHEWABLE ORAL DAILY
COMMUNITY

## 2025-03-25 NOTE — PAT NURSING NOTE
PreOp Instructions     You are scheduled for: a Cardiac Procedure     Date of Procedure: 03/26/25     Diet Instructions: Do not eat or drink anything after midnight including gum, mints, candy, etc the night before your procedure.     Medications: Take one Aspirin 81 mg and one Plavix 75mg the day of your procedure.  Medications you are allowed to take can be taken with a sip of water the morning of your procedure. You can also take your Amlodipine and Atorvastatin. You should have already taken the Plavix 600 mg bolus dose on Tuesday 3/25.     Medications to Stop: Do not take any herbal supplements and vitamins the morning of your procedure.     Skin Prep : Shower with antibacterial soap using a clean washcloth, prior to procedure. Once dried off, no lotions/powders/creams/ointments, etc., Do not shave the procedure area, this will be completed at the hospital during the preparation phase.     Arrival Time: Your arrival time is at 2:00 PM. Your procedure will start around 3:00 PM.    Driving After Procedure: Sedation will be given so you WILL NOT be able to drive home. You will need a responsible adult  to drive you home. You can NOT take uber or taxi unless approved by your physician in advance.     Discharge Teaching: Your nurse will give you specific instructions before discharge, Most people can resume normal activities in 2-3 days, Any questions, please call the physician's office

## 2025-03-26 ENCOUNTER — HOSPITAL ENCOUNTER (OUTPATIENT)
Dept: INTERVENTIONAL RADIOLOGY/VASCULAR | Facility: HOSPITAL | Age: 50
Discharge: HOME HEALTH | End: 2025-03-27
Attending: STUDENT IN AN ORGANIZED HEALTH CARE EDUCATION/TRAINING PROGRAM | Admitting: STUDENT IN AN ORGANIZED HEALTH CARE EDUCATION/TRAINING PROGRAM
Payer: COMMERCIAL

## 2025-03-26 ENCOUNTER — HOSPITAL ENCOUNTER (OUTPATIENT)
Dept: INTERVENTIONAL RADIOLOGY/VASCULAR | Facility: HOSPITAL | Age: 50
Discharge: HOME | End: 2025-03-27
Attending: STUDENT IN AN ORGANIZED HEALTH CARE EDUCATION/TRAINING PROGRAM | Admitting: STUDENT IN AN ORGANIZED HEALTH CARE EDUCATION/TRAINING PROGRAM
Payer: COMMERCIAL

## 2025-03-26 DIAGNOSIS — I20.9 ANGINA PECTORIS: ICD-10-CM

## 2025-03-26 DIAGNOSIS — R93.89 ABNORMAL COMPUTED TOMOGRAPHY ANGIOGRAPHY (CTA): ICD-10-CM

## 2025-03-26 LAB — ISTAT ACTIVATED CLOTTING TIME: 383 SECONDS (ref 74–137)

## 2025-03-26 PROCEDURE — 4A023N7 MEASUREMENT OF CARDIAC SAMPLING AND PRESSURE, LEFT HEART, PERCUTANEOUS APPROACH: ICD-10-PCS | Performed by: INTERNAL MEDICINE

## 2025-03-26 PROCEDURE — 99152 MOD SED SAME PHYS/QHP 5/>YRS: CPT | Performed by: INTERNAL MEDICINE

## 2025-03-26 PROCEDURE — B241ZZ3 ULTRASONOGRAPHY OF MULTIPLE CORONARY ARTERIES, INTRAVASCULAR: ICD-10-PCS | Performed by: INTERNAL MEDICINE

## 2025-03-26 PROCEDURE — 027034Z DILATION OF CORONARY ARTERY, ONE ARTERY WITH DRUG-ELUTING INTRALUMINAL DEVICE, PERCUTANEOUS APPROACH: ICD-10-PCS | Performed by: INTERNAL MEDICINE

## 2025-03-26 PROCEDURE — 92979 ENDOLUMINL IVUS OCT C EA: CPT | Performed by: INTERNAL MEDICINE

## 2025-03-26 PROCEDURE — 93010 ELECTROCARDIOGRAM REPORT: CPT | Performed by: INTERNAL MEDICINE

## 2025-03-26 PROCEDURE — 92978 ENDOLUMINL IVUS OCT C 1ST: CPT | Performed by: INTERNAL MEDICINE

## 2025-03-26 PROCEDURE — 93005 ELECTROCARDIOGRAM TRACING: CPT

## 2025-03-26 PROCEDURE — 85347 COAGULATION TIME ACTIVATED: CPT

## 2025-03-26 PROCEDURE — 93458 L HRT ARTERY/VENTRICLE ANGIO: CPT | Performed by: INTERNAL MEDICINE

## 2025-03-26 PROCEDURE — 99153 MOD SED SAME PHYS/QHP EA: CPT | Performed by: INTERNAL MEDICINE

## 2025-03-26 PROCEDURE — B2111ZZ FLUOROSCOPY OF MULTIPLE CORONARY ARTERIES USING LOW OSMOLAR CONTRAST: ICD-10-PCS | Performed by: INTERNAL MEDICINE

## 2025-03-26 RX ORDER — SODIUM CHLORIDE 9 MG/ML
INJECTION, SOLUTION INTRAVENOUS
Status: DISCONTINUED | OUTPATIENT
Start: 2025-03-27 | End: 2025-03-26 | Stop reason: HOSPADM

## 2025-03-26 RX ORDER — ASPIRIN 81 MG/1
81 TABLET ORAL DAILY
Status: DISCONTINUED | OUTPATIENT
Start: 2025-03-27 | End: 2025-03-27

## 2025-03-26 RX ORDER — CLOPIDOGREL BISULFATE 75 MG/1
75 TABLET ORAL DAILY
Status: DISCONTINUED | OUTPATIENT
Start: 2025-03-27 | End: 2025-03-27

## 2025-03-26 RX ORDER — HEPARIN SODIUM 5000 [USP'U]/ML
INJECTION, SOLUTION INTRAVENOUS; SUBCUTANEOUS
Status: COMPLETED
Start: 2025-03-26 | End: 2025-03-26

## 2025-03-26 RX ORDER — VERAPAMIL HYDROCHLORIDE 2.5 MG/ML
INJECTION, SOLUTION INTRAVENOUS
Status: COMPLETED
Start: 2025-03-26 | End: 2025-03-26

## 2025-03-26 RX ORDER — SODIUM CHLORIDE 9 MG/ML
INJECTION, SOLUTION INTRAVENOUS CONTINUOUS
Status: ACTIVE | OUTPATIENT
Start: 2025-03-26 | End: 2025-03-26

## 2025-03-26 RX ORDER — DIPHENHYDRAMINE HYDROCHLORIDE 50 MG/ML
INJECTION, SOLUTION INTRAMUSCULAR; INTRAVENOUS
Status: COMPLETED
Start: 2025-03-26 | End: 2025-03-26

## 2025-03-26 RX ORDER — LIDOCAINE HYDROCHLORIDE 10 MG/ML
INJECTION, SOLUTION EPIDURAL; INFILTRATION; INTRACAUDAL; PERINEURAL
Status: COMPLETED
Start: 2025-03-26 | End: 2025-03-26

## 2025-03-26 RX ORDER — MIDAZOLAM HYDROCHLORIDE 1 MG/ML
INJECTION INTRAMUSCULAR; INTRAVENOUS
Status: COMPLETED
Start: 2025-03-26 | End: 2025-03-26

## 2025-03-26 RX ADMIN — SODIUM CHLORIDE: 9 INJECTION, SOLUTION INTRAVENOUS at 17:28:00

## 2025-03-26 NOTE — PROGRESS NOTES
Received pt from cath lab around 1700.  Alert and oriented x4.  On room air with adequate O2 saturations, no complaints of chest pain or shortness of breath.  NSR on tele. R groin site and R radial site clean,dry,intact. Soft with no hematoma.  Continent of bowel and bladder.  Pt and wife updated on plan of care, verbalized understanding.     Addendum:  ~1830: small hematoma starting to form, manual pressure applied and hematoma dissipated.  1845: small hematoma seemed to be starting to form again, manual pressure applied and hematoma dissipated.    ~1900: TR band removed. When TR band was removed, small hematoma evident. Manual pressure applied, TR band back on. HERBER APN notified. Endorsed care to night shift RN.

## 2025-03-26 NOTE — DISCHARGE INSTRUCTIONS
HOME CARE INSTRUCTIONS FOLLOWING CORONARY ANGIOGRAPHY, ANGIOPLASTY (PTCA/PTA) OR INSERTION OF STENT IN THE CORONARY ARTERIES      Activity:   DO NOT drive after the procedure. You may resume driving late the following day according to the nurse or physician’s instructions   Plan on resting and relaxing tonight and tomorrow   Resume your normal activity after 48 hours, or as instructed by your physician   Do not lift anything over 10 pounds for the next 24 hours   Avoid sexual activity for the next 24 hours   Avoid drinking alcohol for the next 24 hours   If the groin site was used, avoid repeated stair use and excessive walking for the next 24 hours   If the wrist was used, avoid bending/flexing of the wrist for the next 24 hours.       What is Normal?   A small lump at the procedure site associated with mild tenderness when touched   The procedure site may be bruised or discolored   There may be a small amount of drainage on the bandage    Special Instructions:   Drink plenty of fluids during the next 24 hours to “flush” the contrast from your system   The bandage is to remain in place for 24 hours   Keep the bandage clean and dry   DO NOT submerge the procedure site for 72 hours (no bath tubs or pools). This includes dishwashing/submersion of the wrist, if the wrist was used   After 24 hours, you must remove the bandage   You should shower after removing the bandage, and wash the procedure site gently with soap and water   If you choose to wear a bandage for a few days, make sure it remains clean and dry and that it is changed daily    When you should NOTIFY YOUR PHYSICIAN:   Bleeding can occur at the procedure site - both on the outside of the skin and/or beneath the surface of the skin   Swelling or a large lump at the procedure site can occur, which may be accompanied by moderate to severe pain. If either of the above occurs, lie down flat. Have someone apply pressure to the procedure site with both hands, as  instructed by the nurse. Hold pressure for 20 minutes and the bleeding should stop. Notify your physician of the occurrence. If the bleeding does not stop, call 911 and continue to apply pressure   If you experience signs of a fever, temperature >101 degrees, chills, infection (redness, swelling, thick yellow drainage, or a foul odor from the procedure site)   If you notice any numbness, tingling, or loss of feeling to your leg or foot for groin access   If you notice any numbness, tingling, or loss of feeling to your fingers or hand, if wrist access was utilized    If you Received a Stent:  - You will remain on an antiplatelet drug and/or aspirin. Antiplatelet medications are usually taken for six months to one year and should not be stopped unless your cardiologist directs you to do so. These medications help to prevent blockage at the stent site. If another physician or dentist asks you to stop your antiplatelet medication, you need to consult your cardiologist first. Together, your cardiologist and other physician can discuss the risks that may be involved if you are not taking the antiplatelet medication.   - If a MRI is necessary, it may be done 4-6 weeks after your procedure. Verify this with your cardiologist.  - Keep the stent card with you at all times! If you need a MRI in the future, your stent card will need to be shown to the technologist before performing the MRI. A duplicate card CANNOT be reproduced.     Other:  - You may resume your present diet, unless otherwise specified by your physician.   - You may resume all of your medications as prescribed, unless otherwise directed by your physician. A list of your medications was provided to you at discharge.  - Please call your physician's office for a follow-up appointment. You should be seen in 2 weeks.  - Do not make any personal/business decisions and/or sign any legal documents for the next 24 hours.

## 2025-03-26 NOTE — DIETARY NOTE
Clinical Nutrition    Dietitian consult received per cardiac rehab standing order. Pt to be educated by cardiac rehab staff and encouraged to attend outpatient classes taught by RD. RD available PRN.    Michelle Pete MS, RD, LDN  Clinical Dietitian  Ext: [phone]

## 2025-03-26 NOTE — PROCEDURES
[facility]   part of [facility]    Cardiac Catheterization Note    Primary Proceduralist: Lucho Crump MD  Procedure Performed: LHC, LAD PCI, IVUS of LM/LAD, LCX  Date of Procedure: 3/26/2025   Indication: Unstable angina  Pre Operative Diagnosis: Unstable angina  Post Operative Diagnosis: CAD  Estimated blood loss: 10cc  Specimens: None    Consent obtained from the patient and documented in the paper chart, unless verbally obtained in an emergency setting.  The benefits and risk of the procedure, including but not limited to infection, bleeding, myocardial infarction, stroke, vascular injury, emergency surgery, renal failure requiring dialysis and death were discussed with the patient. These complications occur in approximately 1-2% of elective procedures, but the risk may be significantly elevated in the setting of acute coronary syndrome, electrical or hemodynamic instability, multivessel disease, reduced LVEF or . The patient consented to any additional procedures performed emergently in order to address a complication or prevent medical deterioration. Viable alternatives were explained to the patient, including continuing medical therapy, with the risks incurred along that course.      Procedure/Technique:    Lidocaine 1% was administered locally. Access of right radial artery obtained using Seldinger technique. Ultrasound was not used.     6 Fr Sheath was inserted. J-wire advanced into the aorta under fluoroscopy.    Left coronary system engaged using 6 Fr EBU 3.0 guide (unable to engage with TIG). Selective angiogram performed.     Right coronary system engaged using 6 Fr TIG.  Selective angiogram performed.  Catheter advanced into the LV. Hemodynamics were obtained.    Coronary Angiogram Findings:  LM: Large caliber artery giving rise to LAD & LCX. No significant stenosis.  LAD: Large caliber artery giving rise to diagonal and septal branches. 90% proximal LAD  stenosis.  LCX: Dominant large caliber artery giving rise to OM branches. No significant stenosis.  RCA: Medium caliber vessel giving rise to acute marginals. No significant stenosis.    Hemodynamics:  /0, LVEDP 14  /76, mean 96  No significant gradient upon Ao-LV pullback    Intervention:  Switched to femoral access due to tortuous anatomy  6 Fr EBU 3.0 Guide used to engage LM  Damian Blue crossed proximal LAD lesion  Runthrough wire placed in LCX for protection  IVUS of LAD performed. It showed soft plaque. There were ischemic ST changes during assessment  Pre-dilated using 3.0 x 20mm balloon  CECELIA placement (3.0 x 24mm) to proximal LAD  P.E using 3.5 x 15mm NC up to 18 keira  IVUS of LAD, LM, LCX showed good stent apposition, and positioning immediately following distal LM bifurcation with preserved LCX ostial patency  Angiogram showed JERMAINE III flow (lesion 90% > 0%)    Monitored sedation administered by the cath lab RN, and supervised throughout the procedure by myself. Total time 75 minutes.     Closure: Femoral angiogram performed. Perclosure.    No immediate complications.    A/P:  90% near ostial LAD stenosis s/p IVUS guided PCI. CECELIA x 1.  DAPT, statin  Tentative discharge early tomorrow morning      Lucho Crump MD  Interventional Cardiology  [facility]

## 2025-03-27 VITALS
TEMPERATURE: 98 F | DIASTOLIC BLOOD PRESSURE: 90 MMHG | HEIGHT: 63 IN | SYSTOLIC BLOOD PRESSURE: 141 MMHG | RESPIRATION RATE: 16 BRPM | HEART RATE: 84 BPM | BODY MASS INDEX: 31.01 KG/M2 | OXYGEN SATURATION: 94 % | WEIGHT: 175 LBS

## 2025-03-27 LAB
ANION GAP SERPL CALC-SCNC: 10 MMOL/L (ref 0–18)
ATRIAL RATE: 64 BPM
ATRIAL RATE: 71 BPM
BUN BLD-MCNC: 11 MG/DL (ref 9–23)
CALCIUM BLD-MCNC: 9.1 MG/DL (ref 8.7–10.6)
CHLORIDE SERPL-SCNC: 107 MMOL/L (ref 98–112)
CO2 SERPL-SCNC: 23 MMOL/L (ref 21–32)
CREAT BLD-MCNC: 1.24 MG/DL
EGFRCR SERPLBLD CKD-EPI 2021: 70 ML/MIN/1.73M2 (ref 60–?)
ERYTHROCYTE [DISTWIDTH] IN BLOOD BY AUTOMATED COUNT: 11.8 %
GLUCOSE BLD-MCNC: 105 MG/DL (ref 70–99)
HCT VFR BLD AUTO: 41.6 %
HGB BLD-MCNC: 14 G/DL
MCH RBC QN AUTO: 30.6 PG (ref 26–34)
MCHC RBC AUTO-ENTMCNC: 33.7 G/DL (ref 31–37)
MCV RBC AUTO: 91 FL
OSMOLALITY SERPL CALC.SUM OF ELEC: 290 MOSM/KG (ref 275–295)
P AXIS: 63 DEGREES
P AXIS: 67 DEGREES
P-R INTERVAL: 126 MS
P-R INTERVAL: 136 MS
PLATELET # BLD AUTO: 282 10(3)UL (ref 150–450)
POTASSIUM SERPL-SCNC: 4 MMOL/L (ref 3.5–5.1)
Q-T INTERVAL: 418 MS
Q-T INTERVAL: 418 MS
QRS DURATION: 112 MS
QRS DURATION: 112 MS
QTC CALCULATION (BEZET): 431 MS
QTC CALCULATION (BEZET): 454 MS
R AXIS: 0 DEGREES
R AXIS: 13 DEGREES
RBC # BLD AUTO: 4.57 X10(6)UL
SODIUM SERPL-SCNC: 140 MMOL/L (ref 136–145)
T AXIS: 0 DEGREES
T AXIS: 6 DEGREES
VENTRICULAR RATE: 64 BPM
VENTRICULAR RATE: 71 BPM
WBC # BLD AUTO: 10.2 X10(3) UL (ref 4–11)

## 2025-03-27 PROCEDURE — 80048 BASIC METABOLIC PNL TOTAL CA: CPT | Performed by: INTERNAL MEDICINE

## 2025-03-27 PROCEDURE — 99211 OFF/OP EST MAY X REQ PHY/QHP: CPT

## 2025-03-27 PROCEDURE — 93005 ELECTROCARDIOGRAM TRACING: CPT

## 2025-03-27 PROCEDURE — 85027 COMPLETE CBC AUTOMATED: CPT | Performed by: INTERNAL MEDICINE

## 2025-03-27 PROCEDURE — 93010 ELECTROCARDIOGRAM REPORT: CPT | Performed by: INTERNAL MEDICINE

## 2025-03-27 RX ORDER — ACETAMINOPHEN 500 MG
1000 TABLET ORAL EVERY 6 HOURS PRN
Status: DISCONTINUED | OUTPATIENT
Start: 2025-03-27 | End: 2025-03-27

## 2025-03-27 RX ORDER — ATORVASTATIN CALCIUM 80 MG/1
80 TABLET, FILM COATED ORAL NIGHTLY
Status: DISCONTINUED | OUTPATIENT
Start: 2025-03-27 | End: 2025-03-27

## 2025-03-27 RX ORDER — ACETAMINOPHEN 500 MG
500 TABLET ORAL EVERY 6 HOURS PRN
Status: DISCONTINUED | OUTPATIENT
Start: 2025-03-27 | End: 2025-03-27

## 2025-03-27 RX ADMIN — CLOPIDOGREL BISULFATE 75 MG: 75 TABLET ORAL at 08:38:00

## 2025-03-27 RX ADMIN — ACETAMINOPHEN 1000 MG: 500 MG TABLET ORAL at 06:12:00

## 2025-03-27 RX ADMIN — ASPIRIN 81 MG: 81 TABLET ORAL at 08:38:00

## 2025-03-27 NOTE — PROGRESS NOTES
[facility]  Cardiology Progress Note    Jose Alfredo Gunderson Patient Status:  Observation    1975 MRN [id number]   Formerly [facility] Attending Milton Peter,    Hosp Day # 0 PCP CHRISTIANO HILL       Subjective:  No acute events overnight  Feels no CP with activity    --------------------------------------------------------------------------------------------------------------------------------  ROS 12 systems reviewed and at baseline, pertinent findings above.      History:  Past Medical History:    Decorative tattoo    Headache disorder    High blood pressure    Pain in joints     Past Surgical History:   Procedure Laterality Date    Other      R patellar surgery    Other      L shoulder \"restructured\"    Sinus surgery        Vasectomy       Family History   Problem Relation Age of Onset    Hypertension Father     Heart Disorder Father     Heart Attack Father     Hypertension Mother     Heart Disorder Mother     Diabetes Mother       reports that he has never smoked. He has never used smokeless tobacco. He reports that he does not drink alcohol and does not use drugs.    Objective:   Temp: 97.6 °F (36.4 °C)  Pulse: 84  Resp: 16  BP: 141/90    Intake/Output:     Intake/Output Summary (Last 24 hours) at 3/27/2025 0819  Last data filed at 3/27/2025 0700  Gross per 24 hour   Intake 1340 ml   Output 1200 ml   Net 140 ml       Physical Exam:  General: NAD.  HEENT: Normocephalic.  Neck: Supple.  Cardiac: RRR. S1, S2 normal. No murmur.  Lungs: GAEB.  Extremities: No edema. Rt radial and femoral sites soft, no hematoma.    Assessment:    Unstable angina s/p LAD PCI  HTN  DLP      Plan:  Underwent proximal (near ostial) LAD PCI. Continue DAPT  Statin  Consider switching CCB to ARB and/or BB in clinic    Okay for discharge    Discussed with patient. Questions answered.    Follow up with Dr. Peter in clinic.    Level of care: L2    Lucho Crump MD  Interventional Cardiology  [facility]  [place]    3/27/2025  8:19 AM

## 2025-03-27 NOTE — PROGRESS NOTES
Alert and oriented x4.  On room air with adequate O2 saturations.  NSR on tele, had a brief episode of chest pain 3/10, PA notified, EKG unchanged. Resolved. Ambulated without chest pain or shortness of breath. R radial site clean,dry,intact and soft. R groin site also c,d,I and soft.   Continent of bowel and bladder.  Up ad benedict.   Pt and wife updated on plan of care, verbalized understanding.

## 2025-03-27 NOTE — PROGRESS NOTES
NURSING DISCHARGE NOTE    Discharged Home via Wheelchair.  Accompanied by RN and Spouse  Belongings Taken by patient/family.      Cleared for dc by cardiology. Discharge instructions given to patient and wife who verbalized understanding. IV and tele removed. All questions answered.

## 2025-03-27 NOTE — CARDIAC REHAB
Cardiac rehab education completed with patient and support person  Stent card given  Patient referred to cardiac rehab  Appointment made

## 2025-03-27 NOTE — PLAN OF CARE
A&Ox4. Patient on room air. Continuous pulse o2 sensor maintained. Normal sinus rhythm on telemetry. Pt denies chest pain or shortness of breath. R radial and R groin angiogram site soft, no hematoma. Patient continent of bowel and bladder. No complaints of pain. Patient in bed with fall precautions in place. Partner to stay at bedside. Discussed plan of care with patient. Patient verbalizes understanding.    Plan of care: monitor for bleeding, monitor labs.     Problem: Patient/Family Goals  Goal: Patient/Family Long Term Goal  Description: Patient's Long Term Goal: Go home  Interventions:  - Monitor lab values  - consults to see  - Monitor for bleeding  - See additional Care Plan goals for specific interventions  Outcome: Progressing  Goal: Patient/Family Short Term Goal  Description: Patient's Short Term Goal: feel better  Interventions:   - Monitor angiogram sites  - Follow medication regimen  - See additional Care Plan goals for specific interventions  Outcome: Progressing

## 2025-04-01 ENCOUNTER — MED REC SCAN ONLY (OUTPATIENT)
Facility: CLINIC | Age: 50
End: 2025-04-01

## 2025-04-04 ENCOUNTER — APPOINTMENT (OUTPATIENT)
Dept: ULTRASOUND IMAGING | Facility: HOSPITAL | Age: 50
End: 2025-04-04
Attending: EMERGENCY MEDICINE
Payer: COMMERCIAL

## 2025-04-04 ENCOUNTER — HOSPITAL ENCOUNTER (EMERGENCY)
Facility: HOSPITAL | Age: 50
Discharge: HOME | End: 2025-04-05
Attending: EMERGENCY MEDICINE
Payer: COMMERCIAL

## 2025-04-04 VITALS
RESPIRATION RATE: 16 BRPM | OXYGEN SATURATION: 98 % | SYSTOLIC BLOOD PRESSURE: 155 MMHG | TEMPERATURE: 99 F | WEIGHT: 173 LBS | HEART RATE: 69 BPM | DIASTOLIC BLOOD PRESSURE: 83 MMHG | BODY MASS INDEX: 30.65 KG/M2 | HEIGHT: 63 IN

## 2025-04-04 DIAGNOSIS — S30.1XXA HEMATOMA OF GROIN, INITIAL ENCOUNTER: Primary | ICD-10-CM

## 2025-04-04 LAB
ALBUMIN SERPL-MCNC: 5.1 G/DL (ref 3.2–4.8)
ALBUMIN/GLOB SERPL: 1.6 {RATIO} (ref 1–2)
ALP LIVER SERPL-CCNC: 76 U/L
ALT SERPL-CCNC: 23 U/L
ANION GAP SERPL CALC-SCNC: 5 MMOL/L (ref 0–18)
APTT PPP: 30.6 SECONDS (ref 23–36)
AST SERPL-CCNC: 24 U/L (ref ?–34)
BASOPHILS # BLD AUTO: 0.05 X10(3) UL (ref 0–0.2)
BASOPHILS NFR BLD AUTO: 0.5 %
BILIRUB SERPL-MCNC: 0.7 MG/DL (ref 0.3–1.2)
BUN BLD-MCNC: 12 MG/DL (ref 9–23)
CALCIUM BLD-MCNC: 10.4 MG/DL (ref 8.7–10.6)
CHLORIDE SERPL-SCNC: 107 MMOL/L (ref 98–112)
CO2 SERPL-SCNC: 26 MMOL/L (ref 21–32)
CREAT BLD-MCNC: 1.32 MG/DL
EGFRCR SERPLBLD CKD-EPI 2021: 65 ML/MIN/1.73M2 (ref 60–?)
EOSINOPHIL # BLD AUTO: 0.23 X10(3) UL (ref 0–0.7)
EOSINOPHIL NFR BLD AUTO: 2.4 %
ERYTHROCYTE [DISTWIDTH] IN BLOOD BY AUTOMATED COUNT: 11.6 %
GLOBULIN PLAS-MCNC: 3.2 G/DL (ref 2–3.5)
GLUCOSE BLD-MCNC: 104 MG/DL (ref 70–99)
HCT VFR BLD AUTO: 45.9 %
HGB BLD-MCNC: 15.5 G/DL
IMM GRANULOCYTES # BLD AUTO: 0.03 X10(3) UL (ref 0–1)
IMM GRANULOCYTES NFR BLD: 0.3 %
INR BLD: 1 (ref 0.8–1.2)
LYMPHOCYTES # BLD AUTO: 3.17 X10(3) UL (ref 1–4)
LYMPHOCYTES NFR BLD AUTO: 33.5 %
MCH RBC QN AUTO: 31 PG (ref 26–34)
MCHC RBC AUTO-ENTMCNC: 33.8 G/DL (ref 31–37)
MCV RBC AUTO: 91.8 FL
MONOCYTES # BLD AUTO: 0.74 X10(3) UL (ref 0.1–1)
MONOCYTES NFR BLD AUTO: 7.8 %
NEUTROPHILS # BLD AUTO: 5.23 X10 (3) UL (ref 1.5–7.7)
NEUTROPHILS # BLD AUTO: 5.23 X10(3) UL (ref 1.5–7.7)
NEUTROPHILS NFR BLD AUTO: 55.5 %
OSMOLALITY SERPL CALC.SUM OF ELEC: 286 MOSM/KG (ref 275–295)
PLATELET # BLD AUTO: 349 10(3)UL (ref 150–450)
POTASSIUM SERPL-SCNC: 4.2 MMOL/L (ref 3.5–5.1)
PROT SERPL-MCNC: 8.3 G/DL (ref 5.7–8.2)
PROTHROMBIN TIME: 13.3 SECONDS (ref 11.6–14.8)
RBC # BLD AUTO: 5 X10(6)UL
SODIUM SERPL-SCNC: 138 MMOL/L (ref 136–145)
WBC # BLD AUTO: 9.5 X10(3) UL (ref 4–11)

## 2025-04-04 PROCEDURE — 93971 EXTREMITY STUDY: CPT | Performed by: EMERGENCY MEDICINE

## 2025-04-04 PROCEDURE — 99284 EMERGENCY DEPT VISIT MOD MDM: CPT

## 2025-04-04 PROCEDURE — 85730 THROMBOPLASTIN TIME PARTIAL: CPT | Performed by: EMERGENCY MEDICINE

## 2025-04-04 PROCEDURE — 36415 COLL VENOUS BLD VENIPUNCTURE: CPT

## 2025-04-04 PROCEDURE — 76882 US LMTD JT/FCL EVL NVASC XTR: CPT | Performed by: EMERGENCY MEDICINE

## 2025-04-04 PROCEDURE — 80053 COMPREHEN METABOLIC PANEL: CPT | Performed by: EMERGENCY MEDICINE

## 2025-04-04 PROCEDURE — 85025 COMPLETE CBC W/AUTO DIFF WBC: CPT | Performed by: EMERGENCY MEDICINE

## 2025-04-04 PROCEDURE — 85610 PROTHROMBIN TIME: CPT | Performed by: EMERGENCY MEDICINE

## 2025-04-05 NOTE — ED INITIAL ASSESSMENT (HPI)
Patient ambulatory to ED with c/o worsening hematomas to the right groin following stent placement last Wednesday. States they went away and then came back. They are more painful and patient thinks they are moving. Denies urinary symptoms, denies testicular pain or swelling

## 2025-04-05 NOTE — DISCHARGE INSTRUCTIONS
Rest at  home  Ice to area at home  Tylenol for pain at home  Return to the ER if symptoms worsen or if any other problem arise

## 2025-04-05 NOTE — ED PROVIDER NOTES
Patient Seen in: [facility] Emergency Department      History     Chief Complaint   Patient presents with    Postop/Procedure Problem     Stated Complaint: hematoma to groin, getting bigger, s/p angiocath 3/26/25    Subjective:   HPI      Patient is a 49-year-old male who underwent a angiogram on 26 March presents emergency room with a history of a hematoma to his right groin that started after the procedure was completed and now appears to be somewhat bigger and is uncomfortable in the area.  The patient also had an angiogram spot to the right wrist and states he has some mild swelling to the right wrist as well.  Patient denies history of any fall or trauma.  The patient denies fever.  The patient denies any other somatic complaints or discomfort at this time.    Objective:     No pertinent past medical history.            No pertinent past surgical history.              No pertinent social history.                Physical Exam     ED Triage Vitals [04/04/25 1958]   /83   Pulse 69   Resp 16   Temp 98.9 °F (37.2 °C)   Temp src Oral   SpO2 98 %   O2 Device None (Room air)       Current Vitals:   Vital Signs  BP: 155/83  Pulse: 69  Resp: 16  Temp: 98.9 °F (37.2 °C)  Temp src: Oral    Oxygen Therapy  SpO2: 98 %  O2 Device: None (Room air)        Physical Exam  GENERAL: Well-developed, well-nourished male sitting up breathing easily in no apparent distress.  Patient is nontoxic in appearance.  HEENT: Head is normocephalic, atraumatic. Pupils are 4 mm equally round and reactive to light. Oropharynx is clear. Mucous membranes are moist.  LUNGS: Clear to auscultation bilaterally with no wheeze. There is good equal air entry bilaterally.  HEART: Regular rate and rhythm. Normal S1, S2 no S3, or S4. No murmur.  ABDOMEN: There is no focal tenderness to palpation appreciated anywhere throughout the abdomen. There is no guarding, no rebound, no mass, and no organomegaly appreciated. There is normoactive bowel  sounds. There is no hernia.  EXTREMITIES: There is no cyanosis, clubbing, or edema appreciated. Pulses are 2+ and equal in all 4 extremities.  There is a small hematoma noted to the volar aspect of the right wrist with no other swelling or focal tenderness throughout the right wrist appreciated.  There is an area of some hematoma noted to the right inguinal area with no evidence of any pulsatile mass or significant swelling appreciated in the right lower extremity.  NEURO: Patient is awake, alert and oriented to time place and person. Motor strength is 5 over 5 in all 4 extremities. There are no gross motor or sensory deficits appreciated.  Patient is up and ambulatory in the ER with a steady gait and no ataxia.          ED Course     Labs Reviewed   COMP METABOLIC PANEL (14) - Abnormal; Notable for the following components:       Result Value    Glucose 104 (*)     Creatinine 1.32 (*)     Total Protein 8.3 (*)     Albumin 5.1 (*)     All other components within normal limits   PROTHROMBIN TIME (PT) - Normal   PTT, ACTIVATED - Normal   CBC WITH DIFFERENTIAL WITH PLATELET            US VENOUS DOPPLER LEG RIGHT - DIAG IMG (CPT=93971)    Result Date: 4/4/2025  CONCLUSION:  No evidence of deep vein thrombus in the right lower extremity.   LOCATION:  [facility]    Dictated by (CST): Rome Moore MD on 4/04/2025 at 11:48 PM     Finalized by (CST): Rome Moore MD on 4/04/2025 at 11:49 PM       US GROIN RIGHT LIMITED (CPT=76882)    Result Date: 4/4/2025  CONCLUSION:  See above   LOCATION:  [facility]   Dictated by (CST): Rome Moore MD on 4/04/2025 at 11:46 PM     Finalized by (CST): Rome Moore MD on 4/04/2025 at 11:47 PM             MDM        00:20 patient sitting back and breathing easily in no apparent distress this time.  The patient will be discharged to home at this time.        Medical Decision Making  Patient had an IV line established blood work drawn including a CBC, chemistries, BUN/creatinine, and blood  sugar all of which are unremarkable.  Liver function test found negative.  Coags are unremarkable.  The patient underwent ultrasound with results as noted above.  There is no evidence of any DVT there is no evidence of any pseudoaneurysm.  There is no evidence of any arterial injury.  There is evidence of a 2.9 x 2.6 cm hematoma in the right groin.  Patient sitting back and breathing easily in no apparent distress on initial exam and repeat examination here in the ER.  The patient is up and ambulatory in the ER with a steady gait and no ataxia.  Patient will need follow-up with cardiologist already has a follow-up scheduled for Monday of this coming week.  Patient has been instructed to rest at home apply ice to areas of pain at home to return to the ER immediately if symptoms worsen or if any other problems arise.  Patient discharged to home with no further complaints at this time.    Disposition and Plan     Clinical Impression:  1. Hematoma of groin, initial encounter         Disposition:  Discharge  4/5/2025 12:21 am    Follow-up:  Alka Berg  [address] [facility] [id number] [phone]    Follow up in 2 day(s)  also follow up with your cardiologist as previously scheduled on monday          Medications Prescribed:  Current Discharge Medication List              Supplementary Documentation:

## 2025-04-20 ENCOUNTER — APPOINTMENT (OUTPATIENT)
Dept: GENERAL RADIOLOGY | Facility: HOSPITAL | Age: 50
End: 2025-04-20
Attending: EMERGENCY MEDICINE
Payer: COMMERCIAL

## 2025-04-20 ENCOUNTER — HOSPITAL ENCOUNTER (EMERGENCY)
Facility: HOSPITAL | Age: 50
Discharge: HOME | End: 2025-04-20
Attending: EMERGENCY MEDICINE
Payer: COMMERCIAL

## 2025-04-20 VITALS
TEMPERATURE: 98 F | BODY MASS INDEX: 30.48 KG/M2 | WEIGHT: 172 LBS | OXYGEN SATURATION: 98 % | DIASTOLIC BLOOD PRESSURE: 91 MMHG | SYSTOLIC BLOOD PRESSURE: 139 MMHG | HEART RATE: 66 BPM | HEIGHT: 63 IN | RESPIRATION RATE: 17 BRPM

## 2025-04-20 DIAGNOSIS — S59.911A INJURY OF RIGHT FOREARM, INITIAL ENCOUNTER: Primary | ICD-10-CM

## 2025-04-20 PROCEDURE — 73090 X-RAY EXAM OF FOREARM: CPT | Performed by: EMERGENCY MEDICINE

## 2025-04-20 PROCEDURE — 99284 EMERGENCY DEPT VISIT MOD MDM: CPT

## 2025-04-20 PROCEDURE — 73080 X-RAY EXAM OF ELBOW: CPT | Performed by: EMERGENCY MEDICINE

## 2025-04-20 RX ORDER — LISINOPRIL 10 MG/1
10 TABLET ORAL DAILY
COMMUNITY

## 2025-04-20 RX ORDER — HYDROCODONE BITARTRATE AND ACETAMINOPHEN 5; 325 MG/1; MG/1
1 TABLET ORAL ONCE
Refills: 0 | Status: DISCONTINUED | OUTPATIENT
Start: 2025-04-20 | End: 2025-04-20

## 2025-04-20 RX ORDER — ACETAMINOPHEN 500 MG
1000 TABLET ORAL ONCE
Status: COMPLETED | OUTPATIENT
Start: 2025-04-20 | End: 2025-04-20

## 2025-04-20 RX ORDER — HYDROCODONE BITARTRATE AND ACETAMINOPHEN 5; 325 MG/1; MG/1
1 TABLET ORAL EVERY 6 HOURS PRN
Qty: 10 TABLET | Refills: 0 | Status: SHIPPED | OUTPATIENT
Start: 2025-04-20 | End: 2025-04-27

## 2025-04-20 NOTE — ED PROVIDER NOTES
Patient Seen in: [facility] Emergency Department      History     Chief Complaint   Patient presents with    Arm or Hand Injury     Stated Complaint: C/O RT arm injury fall 2 stairs 1 hr ago    Subjective:   HPI    Patient is a 49-year-old male presenting with right elbow and primarily proximal forearm pain after a fall down the stairs just prior to arrival.  He stumbled descending his stairs, put his right arm to try to catch himself and basically rammed his elbow and proximal forearm/ulna area directly into the banister.  Did not strike his head or fall down but has pain and limited range of motion there since.  No other injuries or complaints.    History of Present Illness               Objective:     Past Medical History:    Decorative tattoo    Essential hypertension    Headache disorder    High blood pressure    Pain in joints              Past Surgical History:   Procedure Laterality Date    Angioplasty (coronary)      Other      R patellar surgery    Other      L shoulder \"restructured\"    Sinus surgery        Vasectomy                  Social History     Socioeconomic History    Marital status:    Tobacco Use    Smoking status: Never    Smokeless tobacco: Never   Vaping Use    Vaping status: Never Used   Substance and Sexual Activity    Alcohol use: Never    Drug use: Never     Social Drivers of Health     Food Insecurity: No Food Insecurity (3/26/2025)    NCSS - Food Insecurity     Worried About Running Out of Food in the Last Year: No     Ran Out of Food in the Last Year: No   Transportation Needs: No Transportation Needs (3/26/2025)    NCSS - Transportation     Lack of Transportation: No   Housing Stability: Not At Risk (3/26/2025)    NCSS - Housing/Utilities     Has Housing: Yes     Worried About Losing Housing: No     Unable to Get Utilities: No                                Physical Exam     ED Triage Vitals [04/20/25 0026]   BP (!) 139/91   Pulse 78   Resp 17   Temp 97.9 °F (36.6 °C)    Temp src Temporal   SpO2 95 %   O2 Device None (Room air)       Current Vitals:   Vital Signs  BP: (!) 139/91  Pulse: 78  Resp: 17  Temp: 97.9 °F (36.6 °C)  Temp src: Temporal  MAP (mmHg): (!) 107    Oxygen Therapy  SpO2: 95 %  O2 Device: None (Room air)        Physical Exam  Vitals and nursing note reviewed.   Constitutional:       Appearance: He is well-developed.   HENT:      Head: Normocephalic and atraumatic.   Eyes:      Conjunctiva/sclera: Conjunctivae normal.      Pupils: Pupils are equal, round, and reactive to light.   Cardiovascular:      Rate and Rhythm: Normal rate and regular rhythm.      Heart sounds: Normal heart sounds.   Pulmonary:      Effort: Pulmonary effort is normal.      Breath sounds: Normal breath sounds.   Abdominal:      General: Bowel sounds are normal.      Palpations: Abdomen is soft.   Musculoskeletal:         General: Normal range of motion.      Comments: There is tenderness to palpation primarily over the proximal ulna.  There is soft tissue swelling but no definite deformity.   Skin:     General: Skin is warm and dry.   Neurological:      Mental Status: He is alert and oriented to person, place, and time.         Physical Exam                ED Course   Labs Reviewed - No data to display       Results            [facility]    NAME: EMILY RODRÍGUEZ    DATE OF EXAM: 04/20/2025  Patient No:  [id number]  Physician:  FRANCIS  YOB: 1975    Past Medical History (entered by Technologist):    Reason For Exam (entered by Technologist):  C/O RT arm injury fall 2 stairs 1 hr ago  Other Notes (entered by Technologist): no prior    Additional Information (per [facility] Radiologist):    X-rays of the right elbow and forearm    IMPRESSION:    No acute fracture or malalignment.  Elbow joint effusion.  Negative variance.                       MDM      49-year-old male presenting with elbow and proximal forearm pain after a fall directly into a wooden banister, leading  with his arm.  He is right-handed.  Did not strike his head or fall to the ground but has pain there and a lot of discomfort with any palpation or attempts at range of motion.  There is some soft tissue swelling although does not look like there is a definite deformity.  X-rays of the elbow and forearm ordered.    Update at 2:15 PM.  X-ray as above is unremarkable with no sign of fracture or dislocation.  There is notable joint effusion but he really does not have pain over the radial head, it is more over the proximal ulna distal to the olecranon and I do not think he needs to be immobilized with concern for occult fracture of the radial head or neck.  Sling for comfort, hydrocodone for home.      Past Medical History-hypertension    Differential diagnosis before testing included fracture, dislocation, soft tissue injury    Co-morbidities that add to the complexity of management include: None    Testing ordered during this visit included x-rays    Radiographic images  I personally reviewed the radiographs and my individual interpretation shows no fracture or dislocation  I also reviewed the official reports that showed no fracture or dislocation            Disposition:        Discharge  I have discussed with the patient the results of test, differential diagnosis, treatment plan, warning signs and symptoms which should prompt immediate return.  They expressed understanding of these instructions and agrees to the following plan provided.  They were given written discharge instructions and agrees to return for any concerns and voiced understanding and all questions were answered.      Medical Decision Making      Disposition and Plan     Clinical Impression:  1. Injury of right forearm, initial encounter         Disposition:  Discharge  4/20/2025  2:17 am    Follow-up:  Alka Berg  [address] [facility] [id number] [phone]    Follow up            Medications Prescribed:  Current Discharge  Medication List        START taking these medications    Details   HYDROcodone-acetaminophen 5-325 MG Oral Tab Take 1 tablet by mouth every 6 (six) hours as needed.  Qty: 10 tablet, Refills: 0    Associated Diagnoses: Injury of right forearm, initial encounter             Supplementary Documentation:

## 2025-04-20 NOTE — DISCHARGE INSTRUCTIONS
Wear sling for comfort, you will have to wear it if you find it annoying or gets in the way.  Hydrocodone as needed for pain.  Do not work or drive when taking that as it can make you drowsy.

## 2025-04-20 NOTE — ED INITIAL ASSESSMENT (HPI)
Pt presents to ED with chief complaint of R arm injury. Patient tripped while going down the stairs, states that he fell last 3 steps and hit his arm. Hx of KYLIE and heart stent

## 2025-05-01 ENCOUNTER — MED REC SCAN ONLY (OUTPATIENT)
Dept: ORTHOPEDICS CLINIC | Facility: CLINIC | Age: 50
End: 2025-05-01

## 2025-05-01 ENCOUNTER — ORDER TRANSCRIPTION (OUTPATIENT)
Age: 50
End: 2025-05-01

## 2025-05-01 DIAGNOSIS — Z95.5 STENTED CORONARY ARTERY: Primary | ICD-10-CM

## 2025-05-06 ENCOUNTER — CARDPULM VISIT (OUTPATIENT)
Age: 50
End: 2025-05-06
Attending: STUDENT IN AN ORGANIZED HEALTH CARE EDUCATION/TRAINING PROGRAM
Payer: COMMERCIAL

## 2025-05-08 ENCOUNTER — CARDPULM VISIT (OUTPATIENT)
Age: 50
End: 2025-05-08
Attending: STUDENT IN AN ORGANIZED HEALTH CARE EDUCATION/TRAINING PROGRAM
Payer: COMMERCIAL

## 2025-05-08 PROCEDURE — 93798 PHYS/QHP OP CAR RHAB W/ECG: CPT

## 2025-05-13 ENCOUNTER — CARDPULM VISIT (OUTPATIENT)
Age: 50
End: 2025-05-13
Attending: STUDENT IN AN ORGANIZED HEALTH CARE EDUCATION/TRAINING PROGRAM
Payer: COMMERCIAL

## 2025-05-13 PROCEDURE — 93798 PHYS/QHP OP CAR RHAB W/ECG: CPT

## 2025-05-15 ENCOUNTER — APPOINTMENT (OUTPATIENT)
Age: 50
End: 2025-05-15
Attending: STUDENT IN AN ORGANIZED HEALTH CARE EDUCATION/TRAINING PROGRAM
Payer: COMMERCIAL

## 2025-05-19 ENCOUNTER — CARDPULM VISIT (OUTPATIENT)
Age: 50
End: 2025-05-19
Attending: INTERNAL MEDICINE
Payer: COMMERCIAL

## 2025-05-19 PROCEDURE — 93798 PHYS/QHP OP CAR RHAB W/ECG: CPT

## 2025-05-20 ENCOUNTER — APPOINTMENT (OUTPATIENT)
Age: 50
End: 2025-05-20
Attending: STUDENT IN AN ORGANIZED HEALTH CARE EDUCATION/TRAINING PROGRAM
Payer: COMMERCIAL

## 2025-05-21 ENCOUNTER — CARDPULM VISIT (OUTPATIENT)
Age: 50
End: 2025-05-21
Attending: INTERNAL MEDICINE
Payer: COMMERCIAL

## 2025-05-21 PROCEDURE — 93798 PHYS/QHP OP CAR RHAB W/ECG: CPT

## 2025-05-23 ENCOUNTER — CARDPULM VISIT (OUTPATIENT)
Age: 50
End: 2025-05-23
Attending: INTERNAL MEDICINE
Payer: COMMERCIAL

## 2025-05-23 PROCEDURE — 93798 PHYS/QHP OP CAR RHAB W/ECG: CPT

## 2025-05-28 ENCOUNTER — CARDPULM VISIT (OUTPATIENT)
Age: 50
End: 2025-05-28
Attending: INTERNAL MEDICINE
Payer: COMMERCIAL

## 2025-05-28 PROCEDURE — 93798 PHYS/QHP OP CAR RHAB W/ECG: CPT

## 2025-05-30 ENCOUNTER — APPOINTMENT (OUTPATIENT)
Age: 50
End: 2025-05-30
Attending: INTERNAL MEDICINE
Payer: COMMERCIAL

## 2025-06-02 ENCOUNTER — CARDPULM VISIT (OUTPATIENT)
Age: 50
End: 2025-06-02
Attending: INTERNAL MEDICINE
Payer: COMMERCIAL

## 2025-06-02 ENCOUNTER — APPOINTMENT (OUTPATIENT)
Dept: URGENT CARE | Age: 50
End: 2025-06-02

## 2025-06-04 ENCOUNTER — APPOINTMENT (OUTPATIENT)
Age: 50
End: 2025-06-04
Attending: INTERNAL MEDICINE
Payer: COMMERCIAL

## 2025-06-06 ENCOUNTER — APPOINTMENT (OUTPATIENT)
Age: 50
End: 2025-06-06
Attending: INTERNAL MEDICINE
Payer: COMMERCIAL

## 2025-06-09 ENCOUNTER — APPOINTMENT (OUTPATIENT)
Age: 50
End: 2025-06-09
Attending: INTERNAL MEDICINE
Payer: COMMERCIAL

## 2025-06-10 ENCOUNTER — MED REC SCAN ONLY (OUTPATIENT)
Facility: CLINIC | Age: 50
End: 2025-06-10

## 2025-06-11 ENCOUNTER — APPOINTMENT (OUTPATIENT)
Age: 50
End: 2025-06-11
Attending: INTERNAL MEDICINE
Payer: COMMERCIAL

## 2025-06-13 ENCOUNTER — APPOINTMENT (OUTPATIENT)
Age: 50
End: 2025-06-13
Attending: INTERNAL MEDICINE
Payer: COMMERCIAL

## 2025-06-16 ENCOUNTER — APPOINTMENT (OUTPATIENT)
Age: 50
End: 2025-06-16
Attending: INTERNAL MEDICINE
Payer: COMMERCIAL

## 2025-06-18 ENCOUNTER — APPOINTMENT (OUTPATIENT)
Age: 50
End: 2025-06-18
Attending: INTERNAL MEDICINE
Payer: COMMERCIAL

## 2025-06-20 ENCOUNTER — APPOINTMENT (OUTPATIENT)
Age: 50
End: 2025-06-20
Attending: INTERNAL MEDICINE
Payer: COMMERCIAL

## 2025-06-23 ENCOUNTER — CARDPULM VISIT (OUTPATIENT)
Age: 50
End: 2025-06-23
Attending: INTERNAL MEDICINE
Payer: COMMERCIAL

## 2025-06-23 PROCEDURE — 93798 PHYS/QHP OP CAR RHAB W/ECG: CPT

## 2025-06-25 ENCOUNTER — APPOINTMENT (OUTPATIENT)
Age: 50
End: 2025-06-25
Attending: INTERNAL MEDICINE
Payer: COMMERCIAL

## 2025-06-25 ENCOUNTER — CARDPULM VISIT (OUTPATIENT)
Age: 50
End: 2025-06-25
Attending: INTERNAL MEDICINE
Payer: COMMERCIAL

## 2025-06-25 PROCEDURE — 93798 PHYS/QHP OP CAR RHAB W/ECG: CPT

## 2025-06-27 ENCOUNTER — CARDPULM VISIT (OUTPATIENT)
Age: 50
End: 2025-06-27
Attending: INTERNAL MEDICINE
Payer: COMMERCIAL

## 2025-06-27 ENCOUNTER — APPOINTMENT (OUTPATIENT)
Age: 50
End: 2025-06-27
Attending: INTERNAL MEDICINE
Payer: COMMERCIAL

## 2025-06-27 PROCEDURE — 93798 PHYS/QHP OP CAR RHAB W/ECG: CPT

## 2025-06-30 ENCOUNTER — CARDPULM VISIT (OUTPATIENT)
Age: 50
End: 2025-06-30
Attending: INTERNAL MEDICINE
Payer: COMMERCIAL

## 2025-06-30 ENCOUNTER — APPOINTMENT (OUTPATIENT)
Age: 50
End: 2025-06-30
Attending: INTERNAL MEDICINE
Payer: COMMERCIAL

## 2025-06-30 PROCEDURE — 93798 PHYS/QHP OP CAR RHAB W/ECG: CPT

## 2025-07-02 ENCOUNTER — APPOINTMENT (OUTPATIENT)
Age: 50
End: 2025-07-02
Attending: INTERNAL MEDICINE
Payer: COMMERCIAL

## 2025-07-07 ENCOUNTER — APPOINTMENT (OUTPATIENT)
Age: 50
End: 2025-07-07
Attending: INTERNAL MEDICINE
Payer: COMMERCIAL

## 2025-07-07 ENCOUNTER — CARDPULM VISIT (OUTPATIENT)
Age: 50
End: 2025-07-07
Attending: INTERNAL MEDICINE
Payer: COMMERCIAL

## 2025-07-07 PROCEDURE — 93798 PHYS/QHP OP CAR RHAB W/ECG: CPT

## 2025-07-09 ENCOUNTER — MED REC SCAN ONLY (OUTPATIENT)
Facility: CLINIC | Age: 50
End: 2025-07-09

## 2025-07-09 ENCOUNTER — CARDPULM VISIT (OUTPATIENT)
Age: 50
End: 2025-07-09
Attending: INTERNAL MEDICINE
Payer: COMMERCIAL

## 2025-07-09 ENCOUNTER — APPOINTMENT (OUTPATIENT)
Age: 50
End: 2025-07-09
Attending: INTERNAL MEDICINE
Payer: COMMERCIAL

## 2025-07-09 PROCEDURE — 93798 PHYS/QHP OP CAR RHAB W/ECG: CPT

## 2025-07-11 ENCOUNTER — APPOINTMENT (OUTPATIENT)
Age: 50
End: 2025-07-11
Attending: INTERNAL MEDICINE
Payer: COMMERCIAL

## 2025-07-14 ENCOUNTER — CARDPULM VISIT (OUTPATIENT)
Age: 50
End: 2025-07-14
Attending: INTERNAL MEDICINE
Payer: COMMERCIAL

## 2025-07-14 ENCOUNTER — APPOINTMENT (OUTPATIENT)
Age: 50
End: 2025-07-14
Attending: INTERNAL MEDICINE
Payer: COMMERCIAL

## 2025-07-14 PROCEDURE — 93798 PHYS/QHP OP CAR RHAB W/ECG: CPT

## 2025-07-16 ENCOUNTER — APPOINTMENT (OUTPATIENT)
Age: 50
End: 2025-07-16
Attending: INTERNAL MEDICINE
Payer: COMMERCIAL

## 2025-07-16 ENCOUNTER — MED REC SCAN ONLY (OUTPATIENT)
Facility: CLINIC | Age: 50
End: 2025-07-16

## 2025-07-18 ENCOUNTER — APPOINTMENT (OUTPATIENT)
Age: 50
End: 2025-07-18
Attending: INTERNAL MEDICINE
Payer: COMMERCIAL

## 2025-07-21 ENCOUNTER — APPOINTMENT (OUTPATIENT)
Age: 50
End: 2025-07-21
Attending: INTERNAL MEDICINE
Payer: COMMERCIAL

## 2025-07-23 ENCOUNTER — APPOINTMENT (OUTPATIENT)
Age: 50
End: 2025-07-23
Attending: INTERNAL MEDICINE
Payer: COMMERCIAL

## 2025-07-25 ENCOUNTER — APPOINTMENT (OUTPATIENT)
Age: 50
End: 2025-07-25
Attending: INTERNAL MEDICINE
Payer: COMMERCIAL

## 2025-07-28 ENCOUNTER — APPOINTMENT (OUTPATIENT)
Age: 50
End: 2025-07-28
Attending: INTERNAL MEDICINE
Payer: COMMERCIAL

## 2025-07-28 ENCOUNTER — APPOINTMENT (OUTPATIENT)
Age: 50
End: 2025-07-28
Attending: INTERNAL MEDICINE

## 2025-07-28 ENCOUNTER — CARDPULM VISIT (OUTPATIENT)
Facility: HOSPITAL | Age: 50
End: 2025-07-28
Attending: INTERNAL MEDICINE

## 2025-07-30 ENCOUNTER — CARDPULM VISIT (OUTPATIENT)
Age: 50
End: 2025-07-30
Attending: INTERNAL MEDICINE
Payer: COMMERCIAL

## 2025-07-30 ENCOUNTER — APPOINTMENT (OUTPATIENT)
Age: 50
End: 2025-07-30
Attending: INTERNAL MEDICINE
Payer: COMMERCIAL

## 2025-07-30 PROCEDURE — 93798 PHYS/QHP OP CAR RHAB W/ECG: CPT

## 2025-08-01 ENCOUNTER — APPOINTMENT (OUTPATIENT)
Facility: HOSPITAL | Age: 50
End: 2025-08-01
Attending: INTERNAL MEDICINE

## 2025-08-04 ENCOUNTER — CARDPULM VISIT (OUTPATIENT)
Age: 50
End: 2025-08-04
Attending: INTERNAL MEDICINE

## 2025-08-04 ENCOUNTER — APPOINTMENT (OUTPATIENT)
Facility: HOSPITAL | Age: 50
End: 2025-08-04
Attending: INTERNAL MEDICINE

## 2025-08-04 PROCEDURE — 93798 PHYS/QHP OP CAR RHAB W/ECG: CPT

## 2025-08-06 ENCOUNTER — CARDPULM VISIT (OUTPATIENT)
Facility: HOSPITAL | Age: 50
End: 2025-08-06
Attending: INTERNAL MEDICINE

## 2025-08-06 ENCOUNTER — APPOINTMENT (OUTPATIENT)
Facility: HOSPITAL | Age: 50
End: 2025-08-06
Attending: INTERNAL MEDICINE

## 2025-08-06 PROCEDURE — 93798 PHYS/QHP OP CAR RHAB W/ECG: CPT

## 2025-08-08 ENCOUNTER — APPOINTMENT (OUTPATIENT)
Facility: HOSPITAL | Age: 50
End: 2025-08-08
Attending: INTERNAL MEDICINE

## 2025-08-11 ENCOUNTER — APPOINTMENT (OUTPATIENT)
Facility: HOSPITAL | Age: 50
End: 2025-08-11
Attending: INTERNAL MEDICINE

## 2025-08-13 ENCOUNTER — APPOINTMENT (OUTPATIENT)
Facility: HOSPITAL | Age: 50
End: 2025-08-13
Attending: INTERNAL MEDICINE

## 2025-08-15 ENCOUNTER — APPOINTMENT (OUTPATIENT)
Facility: HOSPITAL | Age: 50
End: 2025-08-15
Attending: INTERNAL MEDICINE

## 2025-08-18 ENCOUNTER — APPOINTMENT (OUTPATIENT)
Facility: HOSPITAL | Age: 50
End: 2025-08-18
Attending: INTERNAL MEDICINE

## 2025-08-20 ENCOUNTER — APPOINTMENT (OUTPATIENT)
Facility: HOSPITAL | Age: 50
End: 2025-08-20
Attending: INTERNAL MEDICINE

## 2025-08-22 ENCOUNTER — APPOINTMENT (OUTPATIENT)
Facility: HOSPITAL | Age: 50
End: 2025-08-22
Attending: INTERNAL MEDICINE

## 2025-08-25 ENCOUNTER — APPOINTMENT (OUTPATIENT)
Facility: HOSPITAL | Age: 50
End: 2025-08-25
Attending: INTERNAL MEDICINE

## 2025-08-27 ENCOUNTER — APPOINTMENT (OUTPATIENT)
Facility: HOSPITAL | Age: 50
End: 2025-08-27
Attending: INTERNAL MEDICINE

## 2025-08-29 ENCOUNTER — APPOINTMENT (OUTPATIENT)
Facility: HOSPITAL | Age: 50
End: 2025-08-29
Attending: INTERNAL MEDICINE

## (undated) NOTE — LETTER
Date: 3/24/2025    Patient Name: Jose Alfredo Gunderson          To Whom it may concern:    This letter has been written at the patient's request. The above patient was seen at [facility] for treatment of a medical condition.    The patient can continue with no pulling, pushing or lifting greater than 10lbs. Defer to cardiology for stent related restrictions.     Sincerely,          Sincer SAVAGE Peng [facility], PA-C Orthopedic Surgery / Sports Medicine Specialist  [facility]  [address] [place] [address].[facility]  [email or web address]  t: [phone]  o: [phone]  f: [phone]    This note was dictated using Dragon software.  While it was briefly proofread prior to completion, some grammatical, spelling, and word choice errors due to dictation may still occur.

## (undated) NOTE — LETTER
Date & Time: 4/20/2025, 2:44 AM  Patient: Jose Alfredo Gunderson  Encounter Provider(s):    Nba Reyna MD       To Whom It May Concern:    Jose Alfredo Gunderson was seen and treated in our department on 4/20/2025. He can return to work on 04/23/2025.    If you have any questions or concerns, please do not hesitate to call.        _____________________________  Physician/APC Signature

## (undated) NOTE — LETTER
April 20, 2025    Patient: Jose Alfredo Gunderson   Date of Visit: 4/20/2025       To Whom It May Concern:    Jose Alfredo Gunderson was seen and treated in our emergency department on 4/20/2025. He should not return to work until 4/22 .    If you have any questions or concerns, please don't hesitate to call.       Encounter Provider(s):    Nba Reyna MD

## (undated) NOTE — LETTER
Date: 8/14/2024    Patient Name: Jose Alfredo Gunderson          To Whom it may concern:    The above patient was seen at [facility] for treatment of a medical condition.    The patient is permitted to drive. No pulling, pushing, or lifting greater than 10lbs.       Sincerely,          Sincer SAVAGE Peng [facility], PA-C Orthopedic Surgery / Sports Medicine Specialist  [facility]  [address] [place] [address].[facility]  [email or web address]  t: [phone]  o: [phone]  f: [phone]    This note was dictated using Dragon software.  While it was briefly proofread prior to completion, some grammatical, spelling, and word choice errors due to dictation may still occur.

## (undated) NOTE — LETTER
Date: 6/28/2024    Patient Name: Jose Alfredo Gunderson          To Whom it may concern:    This letter has been written at the patient's request. The above patient was seen at [facility] for treatment of a medical condition.    The patient can return to work sedentary work, desk work only. He is not permitted to drive at this time.     Sincerely,          Sincer SAVAGE Peng [facility], PA-C Orthopedic Surgery / Sports Medicine Specialist  [facility]  [address] [place] [address].[facility]  [email or web address]  t: [phone]  o: [phone]  f: [phone]    This note was dictated using Dragon software.  While it was briefly proofread prior to completion, some grammatical, spelling, and word choice errors due to dictation may still occur.

## (undated) NOTE — LETTER
April 5, 2025    Patient: Jose Alfredo Gunderson   Date of Visit: 4/4/2025       To Whom It May Concern:    Jose Alfredo Gunderson was seen and treated in our emergency department on 4/4/2025. He should not return to work until 04/12/20025 .    If you have any questions or concerns, please don't hesitate to call.       Encounter Provider(s):    Femi Jerez MD

## (undated) NOTE — LETTER
Date: 10/18/2024    Patient Name: Jose Alfredo Gunderson          To Whom it may concern:    This letter has been written at the patient's request. The above patient was seen at [facility] for treatment of a medical condition.    The patient can return to work no pulling, pushing or lifting greater than 10lbs.       Sincerely,          Sincer JOCELYN Carey, PA-C Orthopedic Surgery / Sports Medicine Specialist  [facility]  [address] [place] [address].[facility]  [email or web address]  t: [phone]  o: [phone]  f: [phone]    This note was dictated using Dragon software.  While it was briefly proofread prior to completion, some grammatical, spelling, and word choice errors due to dictation may still occur.

## (undated) NOTE — LETTER
Date: 7/3/2024    Patient Name: Jose Alfredo Gunderson          To Whom it may concern:    This letter has been written at the patient's request. The above patient was seen at one of the [facility] for treatment of a medical condition.    We have advised that the patient be allowed to do sedentary work at this time. If no sedentary work is available for patient, we respectfully ask that he be excused from work at this time until he is re-evaluated in our office on July 17, 2024 after an MRI that he has scheduled on July 12, 2024. He is not able to drive at this time.         Sincerely,          Louisr SAVAGE Peng, [facility], PA-C Orthopedic Surgery / Sports Medicine Specialist  [facility]  [address] [place] [address].[email or web address]  [email or web address]  t: [phone]  o: [phone]  f: [phone]    This note was dictated using Dragon software.  While it was briefly proofread prior to completion, some grammatical, spelling, and word choice errors due to dictation may still occur.

## (undated) NOTE — Clinical Note
[address]  [address]  Authorization for Surgical Operation and Procedure     Date:___________                                                                                                         Time:__________  1. I hereby authorize * Surgery not found *, my physician and his/her assistants (if applicable), which may include medical students, residents, and/or fellows, to perform the following surgical operation/ procedure and administer such anesthesia as may be determined necessary by my physician:  Operation/Procedure name (s)  on Jose Alfredo Gunderson   2.   I recognize that during the surgical operation/procedure, unforeseen conditions may necessitate additional or different procedures than those listed above.  I, therefore, further authorize and request that the above-named surgeon, assistants, or designees perform such procedures as are, in their judgment, necessary and desirable.    3.   My surgeon/physician has discussed prior to my surgery the potential benefits, risks and side effects of this procedure; the likelihood of achieving goals; and potential problems that might occur during recuperation.  They also discussed reasonable alternatives to the procedure, including risks, benefits, and side effects related to the alternatives and risks related to not receiving this procedure.  I have had all my questions answered and I acknowledge that no guarantee has been made as to the result that may be obtained.    4.   Should the need arise during my operation/procedure, which includes change of level of care prior to discharge, I also consent to the administration of blood and/or blood products.  Further, I understand that despite careful testing and screening of blood or blood products by collecting agencies, I may still be subject to ill effects as a result of receiving a blood transfusion and/or blood products.  The following are some, but not all, of the potential risks  that can occur: fever and allergic reactions, hemolytic reactions, transmission of diseases such as Hepatitis, AIDS and Cytomegalovirus (CMV) and fluid overload.  In the event that I wish to have an autologous transfusion of my own blood, or a directed donor transfusion, I will discuss this with my physician.  Check only if Refusing Blood or Blood Products  I understand refusal of blood or blood products as deemed necessary by my physician may have serious consequences to my condition to include possible death. I hereby assume responsibility for my refusal and release the hospital, its personnel, and my physicians from any responsibility for the consequences of my refusal.          o  Refuse      5.   I authorize the use of any specimen, organs, tissues, body parts or foreign objects that may be removed from my body during the operation/procedure for diagnosis, research or teaching purposes and their subsequent disposal by hospital authorities.  I also authorize the release of specimen test results and/or written reports to my treating physician on the hospital medical staff or other referring or consulting physicians involved in my care, at the discretion of the Pathologist or my treating physician.    6.   I consent to the photographing or videotaping of the operations or procedures to be performed, including appropriate portions of my body for medical, scientific, or educational purposes, provided my identity is not revealed by the pictures or by descriptive texts accompanying them.  If the procedure has been photographed/videotaped, the surgeon will obtain the original picture, image, videotape or CD.  The hospital will not be responsible for storage, release or maintenance of the picture, image, tape or CD.    7.   I consent to the presence of a  or observers in the operating room as deemed necessary by my physician or their designees.    8.   I recognize that in the event my procedure results  in extended X-Ray/fluoroscopy time, I may develop a skin reaction.    9. If I have a Do Not Attempt Resuscitation (DNAR) order in place, that status will be suspended while in the operating room, procedural suite, and during the recovery period unless otherwise explicitly stated by me (or a person authorized to consent on my behalf). The surgeon or my attending physician will determine when the applicable recovery period ends for purposes of reinstating the DNAR order.  10. Patients having a sterilization procedure: I understand that if the procedure is successful the results will be permanent and it will therefore be impossible for me to inseminate, conceive, or bear children.  I also understand that the procedure is intended to result in sterility, although the result has not been guaranteed.   11. I acknowledge that my physician has explained sedation/analgesia administration to me including the risk and benefits I consent to the administration of sedation/analgesia as may be necessary or desirable in the judgment of my physician.    I CERTIFY THAT I HAVE READ AND FULLY UNDERSTAND THE ABOVE CONSENT TO OPERATION and/or OTHER PROCEDURE.    _________________________________________  __________________________________  Signature of Patient     Signature of Responsible Person         ___________________________________         Printed Name of Responsible Person           _________________________________                 Relationship to Patient  _________________________________________  ______________________________  Signature of Witness          Date  Time      Patient Name: Jose Alfredo Gunderson     : 1975                 Printed: 2025     Medical Record #: [id number]                     Page 1 of [address] [place] [address]  [id number]    Consent for Anesthesia    1. I, Jose Alfredo Gunderson agree to be cared for by an anesthesiologist, who is specially  trained to monitor me and give me medicine to put me to sleep or keep me comfortable during my procedure    I understand that my anesthesiologist is not an employee or agent of [facility] or [employer]. He or she works for [employer].    2. As the patient asking for anesthesia services, I agree to:  a. Allow the anesthesiologist (anesthesia doctor) to give me medicine and do additional procedures as necessary. Some examples are: Starting or using an “IV” to give me medicine, fluids or blood during my procedure, and having a breathing tube placed to help me breathe when I’m asleep (intubation). In the event that my heart stops working properly, I understand that my anesthesiologist will make every effort to sustain my life, unless otherwise directed by [facility] Do Not Resuscitate documents.  b. Tell my anesthesia doctor before my procedure:  i. If I am pregnant.  ii. The last time that I ate or drank.  iii. All of the medicines I take (including prescriptions, herbal supplements, and pills I can buy without a prescription (including street drugs/illegal medications). Failure to inform my anesthesiologist about these medicines may increase my risk of anesthetic complications.  iv. If I am allergic to anything or have had a reaction to anesthesia before.  3. I understand how the anesthesia medicine will help me (benefits).  4. I understand that with any type of anesthesia medicine there are risks:  a. The most common risks are: nausea, vomiting, sore throat, muscle soreness, damage to my eyes, mouth, or teeth (from breathing tube placement).  b. Rare risks include: remembering what happened during my procedure, allergic reactions to medications, injury to my airway, heart, lungs, vision, nerves, or muscles and in extremely rare instances death.  5. My doctor has explained to me other choices available to me for my care (alternatives).  6. Pregnant Patients (“epidural”):  I  understand that the risks of having an epidural (medicine given into my back to help control pain during labor), include itching, low blood pressure, difficulty urinating, headache or slowing of the baby’s heart. Very rare risks include infection, bleeding, seizure, irregular heart rhythms and nerve injury.  7. Regional Anesthesia (“spinal”, “epidural”, & “nerve blocks”):  I understand that rare but potential complications include headache, bleeding, infection, seizure, irregular heart rhythms, and nerve injury.    I can change my mind about having anesthesia services at any time before I get the medicine.    _____________________________________________________________________________  Patient (or Representative) Signature/Relationship to Patient  Date   Time    _____________________________________________________________________________   Name (if used)    Language/Organization   Time    _____________________________________________________________________________  Anesthesiologist Signature     Date   Time  I have discussed the procedure and information above with the patient (or patient’s representative) and answered their questions. The patient or their representative has agreed to have anesthesia services.    _____________________________________________________________________________  Witness        Date   Time  I have verified that the signature is that of the patient or patient’s representative, and that it was signed before the procedure  Patient Name: Jose Alfredo Gunderson     : 1975                 Printed: 2025     Medical Record #: [id number]                     Page 2 of 2

## (undated) NOTE — LETTER
Date & Time: 2/15/2025, 1:13 PM  Patient: Jose Alfredo Gunderson  Encounter Provider(s):    America Cullen MD       To Whom It May Concern:    Jose Alfredo Gunderson was seen and treated in our department on 2/15/2025. He should not return to work until 2/18/25 .    If you have any questions or concerns, please do not hesitate to call.        _____________________________  Physician/APC Signature

## (undated) NOTE — LETTER
Date & Time: 2/15/2025, 1:15 PM  Patient: Jose Alfredo Gunderson  Encounter Provider(s):    America Cullen MD       To Whom It May Concern:    Stacey Gunderson was seen in our department on 2/15/2025 with her spouse who was seen and treated. She can return to work 2/18/25.    If you have any questions or concerns, please do not hesitate to call.        _____________________________  Physician/APC Signature

## (undated) NOTE — LETTER
Date: 1/14/2025    Patient Name: Jose Alfredo Gunderson      To Whom it may concern:      The above patient was unable to be seen at one of the [facility] for treatment of a medical condition  due to not having a form of payment provided by [employer].    The patient will need at the day of the visit a physical check of $885 for the Platelet Rich Plasma Injection.    Prepayment can also be made by calling [phone].        Sincerely,          Bao Peng [facility], PA-C Orthopedic Surgery / Sports Medicine Specialist  [facility]  [address] [place], [place] [email or web address]  [email or web address]  t: [phone]  o: [phone]  f: [phone]

## (undated) NOTE — LETTER
Date & Time: 3/26/2025, 5:02 PM  Patient: Jose Alfredo Gunderson  Encounter Provider(s):    Milton Peter DO Dab[place]neh, [place], MD    To Whom It May Concern:    Jose Alfredo Gunderson was seen and treated in our department on 3/26/2025. He can return to work on 4/4/25 with no restrictions.    If you have any questions or concerns, please do not hesitate to call.        Dr. Lucho Crump  [facility]  [phone]